# Patient Record
Sex: FEMALE | Race: WHITE | NOT HISPANIC OR LATINO | Employment: OTHER | URBAN - NONMETROPOLITAN AREA
[De-identification: names, ages, dates, MRNs, and addresses within clinical notes are randomized per-mention and may not be internally consistent; named-entity substitution may affect disease eponyms.]

---

## 2023-03-07 ENCOUNTER — TELEPHONE (OUTPATIENT)
Dept: PRIMARY CARE | Facility: CLINIC | Age: 73
End: 2023-03-07
Payer: COMMERCIAL

## 2023-03-07 NOTE — TELEPHONE ENCOUNTER
----- Message from Lynette Meeks DO sent at 3/7/2023 11:26 AM EST -----  I went ahead and completed the note which is now ready for pickup.  ----- Message -----  From: Marilyn Escudero MA  Sent: 3/7/2023   8:23 AM EST  To: Lynette Meeks DO    Pt states that she is anorexic and it causes her to have a lot of anxiety.   ----- Message -----  From: Lynette Meeks DO  Sent: 3/7/2023   7:20 AM EST  To: Marilyn Escudero MA    I tried to call her this morning about her request to receive a doctor's excuse to avoid jury duty. I had to leave a voice mail. I told her that we always need to list a specific reason for the excuse and I need to know what her reason is. I started a script which is attached to the paperwork that she sent in. Thanks

## 2023-06-26 DIAGNOSIS — M81.0 OSTEOPOROSIS, UNSPECIFIED OSTEOPOROSIS TYPE, UNSPECIFIED PATHOLOGICAL FRACTURE PRESENCE: ICD-10-CM

## 2023-06-26 RX ORDER — IBANDRONATE SODIUM 150 MG/1
150 TABLET, FILM COATED ORAL
Qty: 1 TABLET | Refills: 11 | Status: SHIPPED | OUTPATIENT
Start: 2023-06-26 | End: 2023-07-27 | Stop reason: SDUPTHER

## 2023-06-26 RX ORDER — IBANDRONATE SODIUM 150 MG/1
150 TABLET, FILM COATED ORAL
COMMUNITY
End: 2023-06-26 | Stop reason: SDUPTHER

## 2023-07-27 ENCOUNTER — LAB (OUTPATIENT)
Dept: LAB | Facility: LAB | Age: 73
End: 2023-07-27
Payer: COMMERCIAL

## 2023-07-27 ENCOUNTER — OFFICE VISIT (OUTPATIENT)
Dept: PRIMARY CARE | Facility: CLINIC | Age: 73
End: 2023-07-27
Payer: COMMERCIAL

## 2023-07-27 VITALS
SYSTOLIC BLOOD PRESSURE: 126 MMHG | BODY MASS INDEX: 20.57 KG/M2 | HEIGHT: 62 IN | DIASTOLIC BLOOD PRESSURE: 72 MMHG | WEIGHT: 111.8 LBS | HEART RATE: 62 BPM | OXYGEN SATURATION: 98 %

## 2023-07-27 DIAGNOSIS — B37.2 YEAST DERMATITIS: ICD-10-CM

## 2023-07-27 DIAGNOSIS — E78.2 MIXED HYPERLIPIDEMIA: ICD-10-CM

## 2023-07-27 DIAGNOSIS — R53.83 OTHER FATIGUE: ICD-10-CM

## 2023-07-27 DIAGNOSIS — K21.9 CHRONIC GERD: ICD-10-CM

## 2023-07-27 DIAGNOSIS — R09.82 PND (POST-NASAL DRIP): Primary | ICD-10-CM

## 2023-07-27 DIAGNOSIS — M81.0 OSTEOPOROSIS, UNSPECIFIED OSTEOPOROSIS TYPE, UNSPECIFIED PATHOLOGICAL FRACTURE PRESENCE: ICD-10-CM

## 2023-07-27 PROBLEM — I65.23 STENOSIS OF BOTH EXTERNAL CAROTID ARTERIES: Status: ACTIVE | Noted: 2023-07-27

## 2023-07-27 PROBLEM — G47.00 INSOMNIA: Status: ACTIVE | Noted: 2023-07-27

## 2023-07-27 PROBLEM — K59.09 CHRONIC CONSTIPATION: Status: ACTIVE | Noted: 2023-07-27

## 2023-07-27 PROBLEM — F41.9 ANXIETY: Status: ACTIVE | Noted: 2023-07-27

## 2023-07-27 PROBLEM — R69 TAKING MEDICATION FOR CHRONIC DISEASE: Status: ACTIVE | Noted: 2023-07-27

## 2023-07-27 LAB
BASOPHILS (10*3/UL) IN BLOOD BY AUTOMATED COUNT: 0.03 X10E9/L (ref 0–0.1)
BASOPHILS/100 LEUKOCYTES IN BLOOD BY AUTOMATED COUNT: 0.7 % (ref 0–2)
EOSINOPHILS (10*3/UL) IN BLOOD BY AUTOMATED COUNT: 0.07 X10E9/L (ref 0–0.4)
EOSINOPHILS/100 LEUKOCYTES IN BLOOD BY AUTOMATED COUNT: 1.6 % (ref 0–6)
ERYTHROCYTE DISTRIBUTION WIDTH (RATIO) BY AUTOMATED COUNT: 13.2 % (ref 11.5–14.5)
ERYTHROCYTE MEAN CORPUSCULAR HEMOGLOBIN CONCENTRATION (G/DL) BY AUTOMATED: 32.6 G/DL (ref 32–36)
ERYTHROCYTE MEAN CORPUSCULAR VOLUME (FL) BY AUTOMATED COUNT: 97 FL (ref 80–100)
ERYTHROCYTES (10*6/UL) IN BLOOD BY AUTOMATED COUNT: 4.25 X10E12/L (ref 4–5.2)
HEMATOCRIT (%) IN BLOOD BY AUTOMATED COUNT: 41.1 % (ref 36–46)
HEMOGLOBIN (G/DL) IN BLOOD: 13.4 G/DL (ref 12–16)
IMMATURE GRANULOCYTES/100 LEUKOCYTES IN BLOOD BY AUTOMATED COUNT: 0.2 % (ref 0–0.9)
LEUKOCYTES (10*3/UL) IN BLOOD BY AUTOMATED COUNT: 4.3 X10E9/L (ref 4.4–11.3)
LYMPHOCYTES (10*3/UL) IN BLOOD BY AUTOMATED COUNT: 1.26 X10E9/L (ref 0.8–3)
LYMPHOCYTES/100 LEUKOCYTES IN BLOOD BY AUTOMATED COUNT: 29.5 % (ref 13–44)
MONOCYTES (10*3/UL) IN BLOOD BY AUTOMATED COUNT: 0.49 X10E9/L (ref 0.05–0.8)
MONOCYTES/100 LEUKOCYTES IN BLOOD BY AUTOMATED COUNT: 11.5 % (ref 2–10)
NEUTROPHILS (10*3/UL) IN BLOOD BY AUTOMATED COUNT: 2.41 X10E9/L (ref 1.6–5.5)
NEUTROPHILS/100 LEUKOCYTES IN BLOOD BY AUTOMATED COUNT: 56.5 % (ref 40–80)
PLATELETS (10*3/UL) IN BLOOD AUTOMATED COUNT: 253 X10E9/L (ref 150–450)
THYROTROPIN (MIU/L) IN SER/PLAS BY DETECTION LIMIT <= 0.05 MIU/L: 3.15 MIU/L (ref 0.44–3.98)

## 2023-07-27 PROCEDURE — 1159F MED LIST DOCD IN RCRD: CPT | Performed by: INTERNAL MEDICINE

## 2023-07-27 PROCEDURE — 84443 ASSAY THYROID STIM HORMONE: CPT

## 2023-07-27 PROCEDURE — 36415 COLL VENOUS BLD VENIPUNCTURE: CPT

## 2023-07-27 PROCEDURE — 99214 OFFICE O/P EST MOD 30 MIN: CPT | Performed by: INTERNAL MEDICINE

## 2023-07-27 PROCEDURE — 1036F TOBACCO NON-USER: CPT | Performed by: INTERNAL MEDICINE

## 2023-07-27 PROCEDURE — 85025 COMPLETE CBC W/AUTO DIFF WBC: CPT

## 2023-07-27 RX ORDER — LORATADINE 10 MG/1
10 TABLET ORAL DAILY
Qty: 30 TABLET | Refills: 5 | Status: CANCELLED | OUTPATIENT
Start: 2023-07-27

## 2023-07-27 RX ORDER — NYSTATIN 100000 U/G
OINTMENT TOPICAL DAILY PRN
Qty: 15 G | Refills: 5 | Status: SHIPPED | OUTPATIENT
Start: 2023-07-27 | End: 2024-01-22

## 2023-07-27 RX ORDER — IBANDRONATE SODIUM 150 MG/1
150 TABLET, FILM COATED ORAL
Qty: 1 TABLET | Refills: 11 | Status: SHIPPED | OUTPATIENT
Start: 2023-07-27 | End: 2024-01-30 | Stop reason: SDUPTHER

## 2023-07-27 RX ORDER — LORATADINE 10 MG/1
10 TABLET ORAL DAILY
COMMUNITY
End: 2023-08-07 | Stop reason: SDUPTHER

## 2023-07-27 RX ORDER — PRAVASTATIN SODIUM 20 MG/1
20 TABLET ORAL DAILY
COMMUNITY
End: 2023-07-27 | Stop reason: SDUPTHER

## 2023-07-27 RX ORDER — FLUTICASONE PROPIONATE 50 MCG
SPRAY, SUSPENSION (ML) NASAL
COMMUNITY
End: 2023-07-27 | Stop reason: SDUPTHER

## 2023-07-27 RX ORDER — FAMOTIDINE 20 MG/1
20 TABLET, FILM COATED ORAL 2 TIMES DAILY
Qty: 60 TABLET | Refills: 5 | Status: SHIPPED | OUTPATIENT
Start: 2023-07-27 | End: 2024-01-22

## 2023-07-27 RX ORDER — FLUTICASONE PROPIONATE 50 MCG
SPRAY, SUSPENSION (ML) NASAL
Qty: 16 G | Refills: 5 | Status: SHIPPED | OUTPATIENT
Start: 2023-07-27 | End: 2024-01-22

## 2023-07-27 RX ORDER — FAMOTIDINE 20 MG/1
20 TABLET, FILM COATED ORAL 2 TIMES DAILY
COMMUNITY
End: 2023-07-27 | Stop reason: SDUPTHER

## 2023-07-27 RX ORDER — MINERAL OIL
180 ENEMA (ML) RECTAL DAILY
Qty: 30 TABLET | Refills: 11 | Status: SHIPPED | OUTPATIENT
Start: 2023-07-27 | End: 2024-01-30 | Stop reason: WASHOUT

## 2023-07-27 RX ORDER — PRAVASTATIN SODIUM 20 MG/1
20 TABLET ORAL DAILY
Qty: 30 TABLET | Refills: 5 | Status: SHIPPED | OUTPATIENT
Start: 2023-07-27 | End: 2024-01-22

## 2023-07-27 RX ORDER — NYSTATIN 100000 U/G
OINTMENT TOPICAL
COMMUNITY
Start: 2022-12-26 | End: 2023-07-27 | Stop reason: SDUPTHER

## 2023-07-27 ASSESSMENT — ENCOUNTER SYMPTOMS
NAUSEA: 0
CHEST TIGHTNESS: 0
ABDOMINAL PAIN: 0
WHEEZING: 0
SHORTNESS OF BREATH: 0
ARTHRALGIAS: 0
PALPITATIONS: 0
DIARRHEA: 0
RHINORRHEA: 1
BACK PAIN: 0
VOMITING: 0
FATIGUE: 1
BLOOD IN STOOL: 0
COUGH: 0

## 2023-07-27 ASSESSMENT — PATIENT HEALTH QUESTIONNAIRE - PHQ9
2. FEELING DOWN, DEPRESSED OR HOPELESS: NOT AT ALL
1. LITTLE INTEREST OR PLEASURE IN DOING THINGS: NOT AT ALL
SUM OF ALL RESPONSES TO PHQ9 QUESTIONS 1 AND 2: 0

## 2023-07-27 NOTE — ASSESSMENT & PLAN NOTE
-Because of her complaints of fatigue which I feel is probably allergy induced, I will have her get a CBC and TSH drawn today before leaving and have agreed to call her with results

## 2023-07-27 NOTE — ASSESSMENT & PLAN NOTE
-We will check a fasting lipid profile just prior to her next follow-up visit  -She will continue with Pravachol 20 mg daily

## 2023-07-27 NOTE — ASSESSMENT & PLAN NOTE
-She is scheduled for a bone density next month and I will try to contact her regarding the results  -She will remain on Boniva 150 mg once a month  -Also encourage calcium and vitamin D supplementation and weightbearing exercises

## 2023-07-27 NOTE — PATIENT INSTRUCTIONS
As we discussed I am recommending that you use the Flonase by taking 1 spray to each nostril twice a day and I sent a new prescription for medication called Allegra or fexofenadine.  This will replace your Claritin and please let me know if this does not help with your allergies  Allergy symptoms can cause 1 to feel tired   I will have you get a CBC and thyroid blood test drawn today before leaving and I will call you with results  Otherwise if everything goes according to plan I will see you back in 6 months for reevaluation and we will be checking your cholesterol as well as your blood sugar and kidney at that time so please remember to go fasting prior to that visit

## 2023-07-27 NOTE — PROGRESS NOTES
Subjective   Patient ID: Jaida Malcolm is a 72 y.o. female who presents for No chief complaint on file..  HPI  She is here today for her 6-month checkup.  Unfortunately she had to have her bone density and mammogram rescheduled for early August but I told her that she could check the results in [facility] and I will also try to message her about the results.  Otherwise she is doing about the same as when we saw her last time.  1 good thing that has changed is that she no longer suffers from constipation.  She states she had constipation for years and had to rely on daily Colace.  She stopped it and she is still having a normal-appearing bowel movement on a regular basis.  She still has allergies with postnasal drip.  She does use the Flonase sometimes with 1 spray to each nostril.  We talked about using it more consistently with 1 spray twice a day to each nostril.  She also feels like the Claritin has not been very effective so we will try to switch to Allegra and I will be sending a prescription to her pharmacy per her request.  She also states she is very physically active.  She still mows 1 acre with a hand a mower and she is strong when doing those activities.  She states sometimes when she sits down to do a puzzle she starts feeling an overwhelming fatigue and wants to fall asleep.  She has had problems with insomnia in the past.  Overall we agreed that her energy levels appear to be okay.  And possibly the allergies are causing some problems with sleeping well at night.  We did conduct a review of systems we briefly discussed how she slightly gets dizzy when standing up from a seated position.  We felt in the past that she had orthostasis and it does quickly clear after she stands.  She knows to call if her condition is worsening and otherwise we would see her back in 6 months for reevaluation with lab work  Review of Systems   Constitutional:  Positive for fatigue.   HENT:  Positive for congestion and  rhinorrhea.    Respiratory:  Negative for cough, chest tightness, shortness of breath and wheezing.    Cardiovascular:  Negative for chest pain, palpitations and leg swelling.   Gastrointestinal:  Negative for abdominal pain, blood in stool, diarrhea, nausea and vomiting.   Musculoskeletal:  Negative for arthralgias and back pain.     Objective   Physical Exam  Vitals and nursing note reviewed.   Constitutional:       General: She is not in acute distress.     Appearance: Normal appearance.   HENT:      Head: Normocephalic and atraumatic.   Eyes:      Conjunctiva/sclera: Conjunctivae normal.   Cardiovascular:      Rate and Rhythm: Normal rate and regular rhythm.      Heart sounds: Normal heart sounds.   Pulmonary:      Effort: No respiratory distress.      Breath sounds: No wheezing.   Abdominal:      Palpations: Abdomen is soft.      Tenderness: There is no abdominal tenderness. There is no guarding.   Musculoskeletal:         General: No swelling. Normal range of motion.   Skin:     General: Skin is warm and dry.   Neurological:      General: No focal deficit present.      Mental Status: She is alert and oriented to person, place, and time.   Psychiatric:         Behavior: Behavior normal.       Assessment/Plan   Problem List Items Addressed This Visit       PND (post-nasal drip) - Primary     -We are going to switch from Claritin to Allegra to see if this is more beneficial  -She will use the Flonase by doing 2 sprays to each nostril every day         Relevant Medications    fluticasone (Flonase) 50 mcg/actuation nasal spray    fexofenadine (Allegra) 180 mg tablet    Mixed hyperlipidemia     -We will check a fasting lipid profile just prior to her next follow-up visit  -She will continue with Pravachol 20 mg daily         Relevant Medications    pravastatin (Pravachol) 20 mg tablet    Other Relevant Orders    Lipid Panel    Osteoporosis     -She is scheduled for a bone density next month and I will try to contact  her regarding the results  -She will remain on Boniva 150 mg once a month  -Also encourage calcium and vitamin D supplementation and weightbearing exercises         Relevant Medications    ibandronate (Boniva) 150 mg tablet    Chronic GERD     -Appears to be relatively well controlled with famotidine 20 mg twice daily         Relevant Medications    famotidine (Pepcid) 20 mg tablet    Other Relevant Orders    Basic Metabolic Panel    Yeast dermatitis     -We did refill the Mycolog for an as-needed basis         Relevant Medications    nystatin (Mycostatin) ointment    Other fatigue     -Because of her complaints of fatigue which I feel is probably allergy induced, I will have her get a CBC and TSH drawn today before leaving and have agreed to call her with results         Relevant Orders    CBC and Auto Differential    TSH          Lynette Meeks, DO

## 2023-07-27 NOTE — ASSESSMENT & PLAN NOTE
-We are going to switch from Claritin to Allegra to see if this is more beneficial  -She will use the Flonase by doing 2 sprays to each nostril every day

## 2023-07-31 DIAGNOSIS — R09.82 PND (POST-NASAL DRIP): Primary | ICD-10-CM

## 2023-08-07 RX ORDER — LORATADINE 10 MG/1
10 TABLET ORAL DAILY
Qty: 30 TABLET | Refills: 1 | Status: SHIPPED | OUTPATIENT
Start: 2023-08-07 | End: 2023-08-10 | Stop reason: SDUPTHER

## 2023-08-10 DIAGNOSIS — R09.82 PND (POST-NASAL DRIP): ICD-10-CM

## 2023-08-10 RX ORDER — LORATADINE 10 MG/1
10 TABLET ORAL DAILY
Qty: 30 TABLET | Refills: 11 | Status: SHIPPED | OUTPATIENT
Start: 2023-08-10 | End: 2024-01-30 | Stop reason: SDUPTHER

## 2024-01-22 DIAGNOSIS — R09.82 PND (POST-NASAL DRIP): ICD-10-CM

## 2024-01-22 DIAGNOSIS — K21.9 CHRONIC GERD: ICD-10-CM

## 2024-01-22 DIAGNOSIS — B37.2 YEAST DERMATITIS: ICD-10-CM

## 2024-01-22 DIAGNOSIS — E78.2 MIXED HYPERLIPIDEMIA: ICD-10-CM

## 2024-01-22 RX ORDER — FLUTICASONE PROPIONATE 50 MCG
SPRAY, SUSPENSION (ML) NASAL
Qty: 16 G | Refills: 5 | Status: SHIPPED | OUTPATIENT
Start: 2024-01-22 | End: 2024-01-30 | Stop reason: SDUPTHER

## 2024-01-22 RX ORDER — PRAVASTATIN SODIUM 20 MG/1
20 TABLET ORAL DAILY
Qty: 30 TABLET | Refills: 5 | Status: SHIPPED | OUTPATIENT
Start: 2024-01-22 | End: 2024-01-30 | Stop reason: SDUPTHER

## 2024-01-22 RX ORDER — FAMOTIDINE 20 MG/1
20 TABLET, FILM COATED ORAL 2 TIMES DAILY
Qty: 60 TABLET | Refills: 5 | Status: SHIPPED | OUTPATIENT
Start: 2024-01-22 | End: 2024-01-30 | Stop reason: SDUPTHER

## 2024-01-22 RX ORDER — NYSTATIN 100000 U/G
OINTMENT TOPICAL
Qty: 15 G | Refills: 5 | Status: SHIPPED | OUTPATIENT
Start: 2024-01-22

## 2024-01-25 ENCOUNTER — LAB (OUTPATIENT)
Dept: LAB | Facility: LAB | Age: 74
End: 2024-01-25
Payer: COMMERCIAL

## 2024-01-25 DIAGNOSIS — E78.2 MIXED HYPERLIPIDEMIA: ICD-10-CM

## 2024-01-25 DIAGNOSIS — K21.9 CHRONIC GERD: ICD-10-CM

## 2024-01-25 LAB
ANION GAP SERPL CALC-SCNC: 11 MMOL/L (ref 10–20)
BUN SERPL-MCNC: 19 MG/DL (ref 6–23)
CALCIUM SERPL-MCNC: 9.3 MG/DL (ref 8.6–10.3)
CHLORIDE SERPL-SCNC: 105 MMOL/L (ref 98–107)
CHOLEST SERPL-MCNC: 210 MG/DL (ref 0–199)
CHOLESTEROL/HDL RATIO: 2.8
CO2 SERPL-SCNC: 29 MMOL/L (ref 21–32)
CREAT SERPL-MCNC: 0.81 MG/DL (ref 0.5–1.05)
EGFRCR SERPLBLD CKD-EPI 2021: 77 ML/MIN/1.73M*2
GLUCOSE SERPL-MCNC: 92 MG/DL (ref 74–99)
HDLC SERPL-MCNC: 75 MG/DL
LDLC SERPL CALC-MCNC: 107 MG/DL
NON HDL CHOLESTEROL: 135 MG/DL (ref 0–149)
POTASSIUM SERPL-SCNC: 4.4 MMOL/L (ref 3.5–5.3)
SODIUM SERPL-SCNC: 141 MMOL/L (ref 136–145)
TRIGL SERPL-MCNC: 142 MG/DL (ref 0–149)
VLDL: 28 MG/DL (ref 0–40)

## 2024-01-25 PROCEDURE — 36415 COLL VENOUS BLD VENIPUNCTURE: CPT

## 2024-01-25 PROCEDURE — 80048 BASIC METABOLIC PNL TOTAL CA: CPT

## 2024-01-25 PROCEDURE — 80061 LIPID PANEL: CPT

## 2024-01-30 ENCOUNTER — OFFICE VISIT (OUTPATIENT)
Dept: PRIMARY CARE | Facility: CLINIC | Age: 74
End: 2024-01-30
Payer: COMMERCIAL

## 2024-01-30 VITALS
OXYGEN SATURATION: 98 % | HEART RATE: 95 BPM | WEIGHT: 114 LBS | BODY MASS INDEX: 20.98 KG/M2 | HEIGHT: 62 IN | SYSTOLIC BLOOD PRESSURE: 122 MMHG | DIASTOLIC BLOOD PRESSURE: 66 MMHG

## 2024-01-30 DIAGNOSIS — R69 TAKING MEDICATION FOR CHRONIC DISEASE: ICD-10-CM

## 2024-01-30 DIAGNOSIS — K64.9 ACUTE HEMORRHOID: ICD-10-CM

## 2024-01-30 DIAGNOSIS — M81.0 OSTEOPOROSIS, UNSPECIFIED OSTEOPOROSIS TYPE, UNSPECIFIED PATHOLOGICAL FRACTURE PRESENCE: ICD-10-CM

## 2024-01-30 DIAGNOSIS — H93.13 TINNITUS OF BOTH EARS: Primary | ICD-10-CM

## 2024-01-30 DIAGNOSIS — K21.9 CHRONIC GERD: ICD-10-CM

## 2024-01-30 DIAGNOSIS — I65.23 STENOSIS OF BOTH EXTERNAL CAROTID ARTERIES: ICD-10-CM

## 2024-01-30 DIAGNOSIS — E78.2 MIXED HYPERLIPIDEMIA: ICD-10-CM

## 2024-01-30 DIAGNOSIS — R09.82 PND (POST-NASAL DRIP): ICD-10-CM

## 2024-01-30 PROBLEM — K57.90 DIVERTICULAR DISEASE: Status: RESOLVED | Noted: 2024-01-30 | Resolved: 2024-01-30

## 2024-01-30 PROBLEM — B37.2 YEAST DERMATITIS: Status: RESOLVED | Noted: 2023-07-27 | Resolved: 2024-01-30

## 2024-01-30 PROBLEM — R53.83 OTHER FATIGUE: Status: RESOLVED | Noted: 2023-07-27 | Resolved: 2024-01-30

## 2024-01-30 PROBLEM — R51.9 TEMPORAL HEADACHE: Status: RESOLVED | Noted: 2024-01-30 | Resolved: 2024-01-30

## 2024-01-30 PROCEDURE — 99214 OFFICE O/P EST MOD 30 MIN: CPT | Performed by: INTERNAL MEDICINE

## 2024-01-30 PROCEDURE — 1036F TOBACCO NON-USER: CPT | Performed by: INTERNAL MEDICINE

## 2024-01-30 PROCEDURE — 1159F MED LIST DOCD IN RCRD: CPT | Performed by: INTERNAL MEDICINE

## 2024-01-30 PROCEDURE — 1160F RVW MEDS BY RX/DR IN RCRD: CPT | Performed by: INTERNAL MEDICINE

## 2024-01-30 RX ORDER — PRAVASTATIN SODIUM 20 MG/1
20 TABLET ORAL DAILY
Qty: 30 TABLET | Refills: 5 | Status: SHIPPED | OUTPATIENT
Start: 2024-01-30

## 2024-01-30 RX ORDER — HYDROCORTISONE 25 MG/G
CREAM TOPICAL 4 TIMES DAILY PRN
Qty: 30 G | Refills: 0 | Status: SHIPPED | OUTPATIENT
Start: 2024-01-30 | End: 2024-02-06

## 2024-01-30 RX ORDER — IBANDRONATE SODIUM 150 MG/1
150 TABLET, FILM COATED ORAL
Qty: 1 TABLET | Refills: 5 | Status: SHIPPED | OUTPATIENT
Start: 2024-01-30

## 2024-01-30 RX ORDER — LORATADINE 10 MG/1
10 TABLET ORAL DAILY
Qty: 30 TABLET | Refills: 5 | Status: SHIPPED | OUTPATIENT
Start: 2024-01-30

## 2024-01-30 RX ORDER — FLUTICASONE PROPIONATE 50 MCG
SPRAY, SUSPENSION (ML) NASAL
Qty: 16 G | Refills: 5 | Status: SHIPPED | OUTPATIENT
Start: 2024-01-30

## 2024-01-30 RX ORDER — FAMOTIDINE 20 MG/1
20 TABLET, FILM COATED ORAL 2 TIMES DAILY
Qty: 60 TABLET | Refills: 5 | Status: SHIPPED | OUTPATIENT
Start: 2024-01-30

## 2024-01-30 ASSESSMENT — ENCOUNTER SYMPTOMS
BACK PAIN: 0
DIARRHEA: 0
WHEEZING: 0
VOMITING: 0
BLOOD IN STOOL: 0
PALPITATIONS: 0
COUGH: 0
FATIGUE: 0
NAUSEA: 0
ARTHRALGIAS: 0
ABDOMINAL PAIN: 0
SHORTNESS OF BREATH: 0

## 2024-01-30 ASSESSMENT — PATIENT HEALTH QUESTIONNAIRE - PHQ9
SUM OF ALL RESPONSES TO PHQ9 QUESTIONS 1 AND 2: 0
2. FEELING DOWN, DEPRESSED OR HOPELESS: NOT AT ALL
1. LITTLE INTEREST OR PLEASURE IN DOING THINGS: NOT AT ALL

## 2024-01-30 NOTE — ASSESSMENT & PLAN NOTE
-Her cholesterol profile is excellent and we will continue to check it once a year per Medicare protocol

## 2024-01-30 NOTE — ASSESSMENT & PLAN NOTE
-I am providing a prescription of Anusol and she will call if her hemorrhoids are not improving or she has bleeding or pain

## 2024-01-30 NOTE — PROGRESS NOTES
Subjective   Patient ID: Jaida Malcolm is a 73 y.o. female who presents for Follow-up (6 MO FUV. ).  HPI  She is today for her general 6-month checkup.  Overall she appears to be doing well although she states the winter months are sometimes tough because she does not have a lot to do and she hates the cold weather.  We did conduct a review of systems and I am pleased with her blood pressure today as well as her weight.  We also reviewed her most recent laboratory test results and overall I am very pleased with her numbers.  Her cholesterol profile is excellent and she will continue taking her current cholesterol-lowering medication.  We also discussed her problem list and her medications today.  We are giving her refills on all of her prescription medications.  We talked about her history of mild plaque buildup in the carotid arteries and her last assessment was in March 2020.  I will have her get a carotid Doppler just prior to her next follow-up visit in 6 months.  Review of Systems   Constitutional:  Negative for fatigue.   Respiratory:  Negative for cough, shortness of breath and wheezing.    Cardiovascular:  Negative for chest pain, palpitations and leg swelling.   Gastrointestinal:  Negative for abdominal pain, blood in stool, diarrhea, nausea and vomiting.   Musculoskeletal:  Negative for arthralgias and back pain.     Objective   Physical Exam  Vitals and nursing note reviewed.   Constitutional:       General: She is not in acute distress.     Appearance: Normal appearance.   HENT:      Head: Normocephalic and atraumatic.   Eyes:      Conjunctiva/sclera: Conjunctivae normal.   Cardiovascular:      Rate and Rhythm: Normal rate and regular rhythm.      Heart sounds: Normal heart sounds.   Pulmonary:      Effort: No respiratory distress.      Breath sounds: No wheezing.   Abdominal:      Palpations: Abdomen is soft.      Tenderness: There is no abdominal tenderness. There is no guarding.   Musculoskeletal:          General: No swelling. Normal range of motion.   Skin:     General: Skin is warm and dry.   Neurological:      General: No focal deficit present.      Mental Status: She is alert and oriented to person, place, and time.   Psychiatric:         Behavior: Behavior normal.       Recent Results (from the past 672 hour(s))   Basic Metabolic Panel    Collection Time: 01/25/24  7:12 AM   Result Value Ref Range    Glucose 92 74 - 99 mg/dL    Sodium 141 136 - 145 mmol/L    Potassium 4.4 3.5 - 5.3 mmol/L    Chloride 105 98 - 107 mmol/L    Bicarbonate 29 21 - 32 mmol/L    Anion Gap 11 10 - 20 mmol/L    Urea Nitrogen 19 6 - 23 mg/dL    Creatinine 0.81 0.50 - 1.05 mg/dL    eGFR 77 >60 mL/min/1.73m*2    Calcium 9.3 8.6 - 10.3 mg/dL   Lipid Panel    Collection Time: 01/25/24  7:12 AM   Result Value Ref Range    Cholesterol 210 (H) 0 - 199 mg/dL    HDL-Cholesterol 75.0 mg/dL    Cholesterol/HDL Ratio 2.8     LDL Calculated 107 (H) <=99 mg/dL    VLDL 28 0 - 40 mg/dL    Triglycerides 142 0 - 149 mg/dL    Non HDL Cholesterol 135 0 - 149 mg/dL       Assessment/Plan   Problem List Items Addressed This Visit             ICD-10-CM    PND (post-nasal drip) R09.82     -She is getting along okay with her Flonase nasal spray         Relevant Medications    loratadine (Claritin) 10 mg tablet    fluticasone (Flonase) 50 mcg/actuation nasal spray    Mixed hyperlipidemia E78.2     -Her cholesterol profile is excellent and we will continue to check it once a year per Medicare protocol         Relevant Medications    pravastatin (Pravachol) 20 mg tablet    Osteoporosis M81.0     -Continue with Boniva and focus on fall prevention  -Continue with calcium and vitamin D supplementation         Relevant Medications    ibandronate (Boniva) 150 mg tablet    Stenosis of both external carotid arteries I65.23     -Her last evaluation was in March 2020 and she had mild plaque buildup bilaterally.  -We will check another carotid study just prior to her next  follow-up visit and we will continue to aggressively modify risk factor         Relevant Orders    Vascular US carotid artery duplex bilateral    Chronic GERD K21.9     -Stable at this time and we will continue with her medication         Relevant Medications    famotidine (Pepcid) 20 mg tablet    Taking medication for chronic disease R69    Relevant Orders    Basic Metabolic Panel    Tinnitus of both ears - Primary H93.13     -Complains of some mild ringing in your ears.  We talked about how high frequency hearing loss can contribute and if she desires we could refer her for hearing test or to ENT.  She will let me know         Acute hemorrhoid K64.9     -I am providing a prescription of Anusol and she will call if her hemorrhoids are not improving or she has bleeding or pain         Relevant Medications    hydrocortisone (Anusol-HC) 2.5 % rectal cream          Lynette Meeks, DO

## 2024-01-30 NOTE — ASSESSMENT & PLAN NOTE
-Continue with Boniva and focus on fall prevention  -Continue with calcium and vitamin D supplementation

## 2024-01-30 NOTE — ASSESSMENT & PLAN NOTE
-Complains of some mild ringing in your ears.  We talked about how high frequency hearing loss can contribute and if she desires we could refer her for hearing test or to ENT.  She will let me know

## 2024-01-30 NOTE — PATIENT INSTRUCTIONS
As we discussed I sent a prescription to your pharmacy for medication called Anusol which is a steroid cream to apply to hemorrhoids.  Please be sure to not use this to long because it can cause thinning of the skin.  Please also remember the other things we discussed about getting yourself clean and so forth.  If your hemorrhoids continue to be a problem or source of irritation and I would like for you to be evaluated with a colonoscopy  We have provided refills on all your medications and we will see you back in 6 months for another evaluation.  Just prior to that visit you should be scheduled to have carotid studies to check the arteries in your neck and you will also need to remember to get fasting lab work prior to that visit

## 2024-01-30 NOTE — ASSESSMENT & PLAN NOTE
-Her last evaluation was in March 2020 and she had mild plaque buildup bilaterally.  -We will check another carotid study just prior to her next follow-up visit and we will continue to aggressively modify risk factor

## 2024-05-21 ENCOUNTER — LAB (OUTPATIENT)
Dept: LAB | Facility: LAB | Age: 74
End: 2024-05-21
Payer: COMMERCIAL

## 2024-05-21 ENCOUNTER — OFFICE VISIT (OUTPATIENT)
Dept: PRIMARY CARE | Facility: CLINIC | Age: 74
End: 2024-05-21
Payer: COMMERCIAL

## 2024-05-21 VITALS
HEART RATE: 78 BPM | HEIGHT: 62 IN | DIASTOLIC BLOOD PRESSURE: 72 MMHG | WEIGHT: 113 LBS | SYSTOLIC BLOOD PRESSURE: 138 MMHG | OXYGEN SATURATION: 96 % | BODY MASS INDEX: 20.8 KG/M2

## 2024-05-21 DIAGNOSIS — R53.83 OTHER FATIGUE: ICD-10-CM

## 2024-05-21 DIAGNOSIS — G47.9 SLEEP DIFFICULTIES: ICD-10-CM

## 2024-05-21 DIAGNOSIS — R09.82 PND (POST-NASAL DRIP): ICD-10-CM

## 2024-05-21 DIAGNOSIS — H93.13 TINNITUS OF BOTH EARS: Primary | ICD-10-CM

## 2024-05-21 LAB
BASOPHILS # BLD AUTO: 0.03 X10*3/UL (ref 0–0.1)
BASOPHILS NFR BLD AUTO: 0.7 %
EOSINOPHIL # BLD AUTO: 0.07 X10*3/UL (ref 0–0.4)
EOSINOPHIL NFR BLD AUTO: 1.7 %
ERYTHROCYTE [DISTWIDTH] IN BLOOD BY AUTOMATED COUNT: 13 % (ref 11.5–14.5)
HCT VFR BLD AUTO: 39.1 % (ref 36–46)
HGB BLD-MCNC: 12.9 G/DL (ref 12–16)
IMM GRANULOCYTES # BLD AUTO: 0 X10*3/UL (ref 0–0.5)
IMM GRANULOCYTES NFR BLD AUTO: 0 % (ref 0–0.9)
LYMPHOCYTES # BLD AUTO: 1.25 X10*3/UL (ref 0.8–3)
LYMPHOCYTES NFR BLD AUTO: 31 %
MCH RBC QN AUTO: 31.3 PG (ref 26–34)
MCHC RBC AUTO-ENTMCNC: 33 G/DL (ref 32–36)
MCV RBC AUTO: 95 FL (ref 80–100)
MONOCYTES # BLD AUTO: 0.49 X10*3/UL (ref 0.05–0.8)
MONOCYTES NFR BLD AUTO: 12.2 %
NEUTROPHILS # BLD AUTO: 2.19 X10*3/UL (ref 1.6–5.5)
NEUTROPHILS NFR BLD AUTO: 54.4 %
NRBC BLD-RTO: 0 /100 WBCS (ref 0–0)
PLATELET # BLD AUTO: 233 X10*3/UL (ref 150–450)
RBC # BLD AUTO: 4.12 X10*6/UL (ref 4–5.2)
TSH SERPL-ACNC: 3.34 MIU/L (ref 0.44–3.98)
WBC # BLD AUTO: 4 X10*3/UL (ref 4.4–11.3)

## 2024-05-21 PROCEDURE — 1036F TOBACCO NON-USER: CPT | Performed by: INTERNAL MEDICINE

## 2024-05-21 PROCEDURE — 1159F MED LIST DOCD IN RCRD: CPT | Performed by: INTERNAL MEDICINE

## 2024-05-21 PROCEDURE — 1160F RVW MEDS BY RX/DR IN RCRD: CPT | Performed by: INTERNAL MEDICINE

## 2024-05-21 PROCEDURE — 85025 COMPLETE CBC W/AUTO DIFF WBC: CPT

## 2024-05-21 PROCEDURE — 36415 COLL VENOUS BLD VENIPUNCTURE: CPT

## 2024-05-21 PROCEDURE — 99213 OFFICE O/P EST LOW 20 MIN: CPT | Performed by: INTERNAL MEDICINE

## 2024-05-21 PROCEDURE — 84443 ASSAY THYROID STIM HORMONE: CPT

## 2024-05-21 RX ORDER — MONTELUKAST SODIUM 10 MG/1
10 TABLET ORAL NIGHTLY
Qty: 30 TABLET | Refills: 5 | Status: SHIPPED | OUTPATIENT
Start: 2024-05-21 | End: 2024-06-04 | Stop reason: WASHOUT

## 2024-05-21 RX ORDER — NORTRIPTYLINE HYDROCHLORIDE 10 MG/1
10 CAPSULE ORAL NIGHTLY
Qty: 30 CAPSULE | Refills: 5 | Status: SHIPPED | OUTPATIENT
Start: 2024-05-21 | End: 2024-06-04 | Stop reason: WASHOUT

## 2024-05-21 ASSESSMENT — ENCOUNTER SYMPTOMS
ARTHRALGIAS: 0
FATIGUE: 1
NAUSEA: 0
ABDOMINAL PAIN: 0
UNEXPECTED WEIGHT CHANGE: 0
BLOOD IN STOOL: 0
SHORTNESS OF BREATH: 0
WHEEZING: 0
DIARRHEA: 0
PALPITATIONS: 0
BACK PAIN: 0
CHEST TIGHTNESS: 0
COUGH: 0
VOMITING: 0

## 2024-05-21 NOTE — ASSESSMENT & PLAN NOTE
-She will have a TSH and CBC done today before leaving  -I believe that her fragmented sleep might be a culprit so we will try to treat that and see her back in follow-up  -Also sinus disease can cause some fatigue due to abnormal breathing patterns while sleeping at night

## 2024-05-21 NOTE — PROGRESS NOTES
Subjective   Patient ID: Jaida Malcolm is a 73 y.o. female who presents for Earache (Bilateral ) and Fatigue.  Earache   Pertinent negatives include no abdominal pain, coughing, diarrhea or vomiting.   Fatigue  Associated symptoms include fatigue. Pertinent negatives include no abdominal pain, arthralgias, chest pain, coughing, nausea or vomiting.   She is here today for evaluation of 2 concerns.  1 is that of her ears.  She states her ears sometimes ache and she is also experiencing tinnitus.  We are reminded that she is to see Dr. Bailey and in fact she had sinus surgery in the past but she is no longer able to see him because of insurance reasons.  We talked about seeing a different provider that is covered on her plan and she is willing to travel.  On today's exam when I look in her ears there is no cerumen obstruction but she does have scarring in both of her eardrums.  We will help facilitate a referral.  She continues to use her Flonase and also her Claritin.  She also states she is just been feeling exhausted.  She states that she goes to bed every night but she has quite fragmented sleep.  She states she tosses and turns and she does try to get back to sleep but has difficulties.  She had lab work done just a few months ago that was normal.  I am going to check a CBC and thyroid blood test however because she has not had that checked for a long time and I just want a make sure there is nothing physical going on.  In the meantime I am going to give her a low-dose of nortriptyline to take at bedtime to see if this will help with her fragmented sleep and I will see her back in follow-up in approximately 2 weeks.  Review of Systems   Constitutional:  Positive for fatigue. Negative for unexpected weight change.   HENT:  Positive for ear pain.    Respiratory:  Negative for cough, chest tightness, shortness of breath and wheezing.    Cardiovascular:  Negative for chest pain, palpitations and leg swelling.    Gastrointestinal:  Negative for abdominal pain, blood in stool, diarrhea, nausea and vomiting.   Musculoskeletal:  Negative for arthralgias and back pain.     Objective   Physical Exam  Vitals and nursing note reviewed.   Constitutional:       General: She is not in acute distress.     Appearance: Normal appearance.   HENT:      Head: Normocephalic and atraumatic.      Right Ear: External ear normal.      Left Ear: External ear normal.   Eyes:      Conjunctiva/sclera: Conjunctivae normal.   Cardiovascular:      Rate and Rhythm: Normal rate and regular rhythm.      Heart sounds: Normal heart sounds.   Pulmonary:      Effort: No respiratory distress.      Breath sounds: No wheezing.   Abdominal:      Palpations: Abdomen is soft.      Tenderness: There is no abdominal tenderness. There is no guarding.   Musculoskeletal:         General: No swelling. Normal range of motion.   Skin:     General: Skin is warm and dry.   Neurological:      General: No focal deficit present.      Mental Status: She is alert and oriented to person, place, and time.   Psychiatric:         Behavior: Behavior normal.         Assessment/Plan   Problem List Items Addressed This Visit             ICD-10-CM    PND (post-nasal drip) R09.82    Relevant Medications    montelukast (Singulair) 10 mg tablet    Other fatigue R53.83     -She will have a TSH and CBC done today before leaving  -I believe that her fragmented sleep might be a culprit so we will try to treat that and see her back in follow-up  -Also sinus disease can cause some fatigue due to abnormal breathing patterns while sleeping at night         Relevant Orders    CBC and Auto Differential    TSH    Tinnitus of both ears - Primary H93.13     -We are referring her to ENT for her tinnitus and sinus disease         Relevant Orders    Referral to ENT    Sleep difficulties G47.9     -We will start nortriptyline at 10 mg at bedtime and see her back in 2 weeks for follow-up         Relevant  Medications    nortriptyline (Pamelor) 10 mg capsule          Lynette Meeks, DO

## 2024-05-21 NOTE — PATIENT INSTRUCTIONS
As we discussed I am going to refer you to ENT because of your chronic sinus issues and ringing in the ears.  I am sorry that you are unable to see Dr. Bailey as he originally did your surgery and is quite familiar with you but we understand about insurance so they will try to get another provider as close to [employer] is possible  In the meantime I have sent a new medication for your sinuses and allergies called Singulair and please try this once a day to see if it works  For your fatigue I have ordered a CBC and thyroid blood test which you can get done today because it does not require fasting  I do believe that your fatigue may be from fragmented sleep so I sent a medication to your pharmacy called nortriptyline at a very tiny dose of 10 mg.  Please take this about 1/2-hour prior to your bedtime and lets see if this helps you sleep better.  I will see you back in 2 weeks

## 2024-06-04 ENCOUNTER — OFFICE VISIT (OUTPATIENT)
Dept: PRIMARY CARE | Facility: CLINIC | Age: 74
End: 2024-06-04
Payer: COMMERCIAL

## 2024-06-04 VITALS
HEIGHT: 62 IN | BODY MASS INDEX: 21.35 KG/M2 | HEART RATE: 82 BPM | SYSTOLIC BLOOD PRESSURE: 128 MMHG | OXYGEN SATURATION: 98 % | WEIGHT: 116 LBS | DIASTOLIC BLOOD PRESSURE: 76 MMHG

## 2024-06-04 DIAGNOSIS — Z12.31 ENCOUNTER FOR SCREENING MAMMOGRAM FOR MALIGNANT NEOPLASM OF BREAST: Primary | ICD-10-CM

## 2024-06-04 DIAGNOSIS — G47.09 OTHER INSOMNIA: ICD-10-CM

## 2024-06-04 PROBLEM — R53.83 OTHER FATIGUE: Status: RESOLVED | Noted: 2023-07-27 | Resolved: 2024-06-04

## 2024-06-04 PROBLEM — K64.9 ACUTE HEMORRHOID: Status: RESOLVED | Noted: 2024-01-30 | Resolved: 2024-06-04

## 2024-06-04 PROBLEM — G47.9 SLEEP DIFFICULTIES: Status: RESOLVED | Noted: 2024-05-21 | Resolved: 2024-06-04

## 2024-06-04 PROCEDURE — 1036F TOBACCO NON-USER: CPT | Performed by: INTERNAL MEDICINE

## 2024-06-04 PROCEDURE — 1160F RVW MEDS BY RX/DR IN RCRD: CPT | Performed by: INTERNAL MEDICINE

## 2024-06-04 PROCEDURE — 1159F MED LIST DOCD IN RCRD: CPT | Performed by: INTERNAL MEDICINE

## 2024-06-04 PROCEDURE — 99213 OFFICE O/P EST LOW 20 MIN: CPT | Performed by: INTERNAL MEDICINE

## 2024-06-04 NOTE — PATIENT INSTRUCTIONS
I am glad that you have an appointment to see ENT  I took Singulair and nortriptyline off your medication list  Please read the handout I gave you today on insomnia and actually we did 3 times and follow all of the advised that he gives on improving her sleep habits.  It might take a week or 2 to get on a schedule so do not get frustrated when is not working in the very beginning and again the better you are and following these directions the better off you will be with your sleep  As we discussed in detail there are prescription medications I can give you for insomnia if this does not work   The staff will be helping you to schedule your mammogram later this summer and I will see you back as previously planned

## 2024-06-04 NOTE — PROGRESS NOTES
Subjective   Patient ID: Jaida Malcolm is a 73 y.o. female who presents for Follow-up (2 week check).  HPI  She is here today for her 2-week follow-up visit.  Because of problems with her sleep patterns and insomnia I have prescribed a very low-dose of nortriptyline.  She states she took it as directed but it almost made her feel more restless.  We talked about insomnia and we talked about sleep hygiene.  She does resort to napping in the daytime when she does not sleep well at night.  We discussed how avoiding naps will hopefully help her sleep really well the following evening and get her back on the schedule.  I am giving her a handout again on insomnia and asked that she read over carefully and follow all the directions that it has for improving sleep habits.  If it is not helpful we did talk about trying a different prescription such as trazodone.  She understands that the sleep aids do have their advantages and disadvantages for side effects and we discussed those today.  Also we did run a CBC and a thyroid blood test.  They look fine.  Her white cell count was off by just 4/10 and we will monitor periodically.  She also is agreeable to getting her screening mammogram scheduled for later this summer  She tried Singulair but she states it was not successful with her symptoms.  I am pleased to hear she does have an appoint with Dr. Morocho coming up in about a month from now.  Objective   Physical Exam  Vitals and nursing note reviewed.   Constitutional:       General: She is not in acute distress.     Appearance: Normal appearance.   HENT:      Head: Normocephalic and atraumatic.   Eyes:      Conjunctiva/sclera: Conjunctivae normal.   Cardiovascular:      Rate and Rhythm: Normal rate and regular rhythm.      Heart sounds: Normal heart sounds.   Pulmonary:      Effort: No respiratory distress.      Breath sounds: No wheezing.   Abdominal:      Palpations: Abdomen is soft.      Tenderness: There is no abdominal  tenderness. There is no guarding.   Musculoskeletal:         General: No swelling. Normal range of motion.   Skin:     General: Skin is warm and dry.   Neurological:      General: No focal deficit present.      Mental Status: She is alert and oriented to person, place, and time.   Psychiatric:         Behavior: Behavior normal.       Recent Results (from the past 336 hour(s))   CBC and Auto Differential    Collection Time: 05/21/24  9:11 AM   Result Value Ref Range    WBC 4.0 (L) 4.4 - 11.3 x10*3/uL    nRBC 0.0 0.0 - 0.0 /100 WBCs    RBC 4.12 4.00 - 5.20 x10*6/uL    Hemoglobin 12.9 12.0 - 16.0 g/dL    Hematocrit 39.1 36.0 - 46.0 %    MCV 95 80 - 100 fL    MCH 31.3 26.0 - 34.0 pg    MCHC 33.0 32.0 - 36.0 g/dL    RDW 13.0 11.5 - 14.5 %    Platelets 233 150 - 450 x10*3/uL    Neutrophils % 54.4 40.0 - 80.0 %    Immature Granulocytes %, Automated 0.0 0.0 - 0.9 %    Lymphocytes % 31.0 13.0 - 44.0 %    Monocytes % 12.2 2.0 - 10.0 %    Eosinophils % 1.7 0.0 - 6.0 %    Basophils % 0.7 0.0 - 2.0 %    Neutrophils Absolute 2.19 1.60 - 5.50 x10*3/uL    Immature Granulocytes Absolute, Automated 0.00 0.00 - 0.50 x10*3/uL    Lymphocytes Absolute 1.25 0.80 - 3.00 x10*3/uL    Monocytes Absolute 0.49 0.05 - 0.80 x10*3/uL    Eosinophils Absolute 0.07 0.00 - 0.40 x10*3/uL    Basophils Absolute 0.03 0.00 - 0.10 x10*3/uL   TSH    Collection Time: 05/21/24  9:11 AM   Result Value Ref Range    Thyroid Stimulating Hormone 3.34 0.44 - 3.98 mIU/L       Assessment/Plan   Problem List Items Addressed This Visit             ICD-10-CM    Insomnia G47.00     -We tried nortriptyline but she did not tolerate it very well  -I am giving her handout again on sleep hygiene and she will make some changes with her sleep patterns to see if this is successful in helping with her insomnia.  She will call if it is not  -She states she does sometimes get nauseated when she is tired and when she gets good rest she is no longer nauseated.         Encounter for  screening mammogram for malignant neoplasm of breast - Primary Z12.31    Relevant Orders    BI mammo bilateral screening tomosynthesis          Lynette Meeks, DO

## 2024-06-04 NOTE — ASSESSMENT & PLAN NOTE
-We tried nortriptyline but she did not tolerate it very well  -I am giving her handout again on sleep hygiene and she will make some changes with her sleep patterns to see if this is successful in helping with her insomnia.  She will call if it is not  -She states she does sometimes get nauseated when she is tired and when she gets good rest she is no longer nauseated.

## 2024-07-16 DIAGNOSIS — E78.2 MIXED HYPERLIPIDEMIA: ICD-10-CM

## 2024-07-16 DIAGNOSIS — K21.9 CHRONIC GERD: ICD-10-CM

## 2024-07-16 DIAGNOSIS — R09.82 PND (POST-NASAL DRIP): ICD-10-CM

## 2024-07-16 DIAGNOSIS — M81.0 OSTEOPOROSIS, UNSPECIFIED OSTEOPOROSIS TYPE, UNSPECIFIED PATHOLOGICAL FRACTURE PRESENCE: ICD-10-CM

## 2024-07-16 RX ORDER — FLUTICASONE PROPIONATE 50 MCG
SPRAY, SUSPENSION (ML) NASAL
Qty: 16 G | Refills: 5 | Status: SHIPPED | OUTPATIENT
Start: 2024-07-16

## 2024-07-16 RX ORDER — IBANDRONATE SODIUM 150 MG/1
150 TABLET, FILM COATED ORAL
Qty: 1 TABLET | Refills: 11 | Status: SHIPPED | OUTPATIENT
Start: 2024-07-16

## 2024-07-16 RX ORDER — FAMOTIDINE 20 MG/1
20 TABLET, FILM COATED ORAL 2 TIMES DAILY
Qty: 60 TABLET | Refills: 5 | Status: SHIPPED | OUTPATIENT
Start: 2024-07-16

## 2024-07-16 RX ORDER — PRAVASTATIN SODIUM 20 MG/1
20 TABLET ORAL DAILY
Qty: 30 TABLET | Refills: 5 | Status: SHIPPED | OUTPATIENT
Start: 2024-07-16

## 2024-07-23 ENCOUNTER — APPOINTMENT (OUTPATIENT)
Dept: VASCULAR MEDICINE | Facility: HOSPITAL | Age: 74
End: 2024-07-23
Payer: COMMERCIAL

## 2024-07-25 ENCOUNTER — HOSPITAL ENCOUNTER (OUTPATIENT)
Dept: VASCULAR MEDICINE | Facility: HOSPITAL | Age: 74
Discharge: HOME | End: 2024-07-25
Payer: COMMERCIAL

## 2024-07-25 DIAGNOSIS — I65.23 STENOSIS OF BOTH EXTERNAL CAROTID ARTERIES: ICD-10-CM

## 2024-07-25 DIAGNOSIS — R09.89 OTHER SPECIFIED SYMPTOMS AND SIGNS INVOLVING THE CIRCULATORY AND RESPIRATORY SYSTEMS: ICD-10-CM

## 2024-07-25 PROCEDURE — 93880 EXTRACRANIAL BILAT STUDY: CPT | Performed by: STUDENT IN AN ORGANIZED HEALTH CARE EDUCATION/TRAINING PROGRAM

## 2024-07-25 PROCEDURE — 93880 EXTRACRANIAL BILAT STUDY: CPT

## 2024-07-30 ENCOUNTER — LAB (OUTPATIENT)
Dept: LAB | Facility: LAB | Age: 74
End: 2024-07-30
Payer: COMMERCIAL

## 2024-07-30 ENCOUNTER — APPOINTMENT (OUTPATIENT)
Dept: PRIMARY CARE | Facility: CLINIC | Age: 74
End: 2024-07-30
Payer: COMMERCIAL

## 2024-07-30 ENCOUNTER — APPOINTMENT (OUTPATIENT)
Dept: VASCULAR MEDICINE | Facility: HOSPITAL | Age: 74
End: 2024-07-30
Payer: COMMERCIAL

## 2024-07-30 DIAGNOSIS — R69 TAKING MEDICATION FOR CHRONIC DISEASE: ICD-10-CM

## 2024-07-30 LAB
ANION GAP SERPL CALC-SCNC: 10 MMOL/L (ref 10–20)
BUN SERPL-MCNC: 19 MG/DL (ref 6–23)
CALCIUM SERPL-MCNC: 9.2 MG/DL (ref 8.6–10.3)
CHLORIDE SERPL-SCNC: 108 MMOL/L (ref 98–107)
CO2 SERPL-SCNC: 27 MMOL/L (ref 21–32)
CREAT SERPL-MCNC: 0.85 MG/DL (ref 0.5–1.05)
EGFRCR SERPLBLD CKD-EPI 2021: 72 ML/MIN/1.73M*2
GLUCOSE SERPL-MCNC: 85 MG/DL (ref 74–99)
POTASSIUM SERPL-SCNC: 4 MMOL/L (ref 3.5–5.3)
SODIUM SERPL-SCNC: 141 MMOL/L (ref 136–145)

## 2024-07-30 PROCEDURE — 36415 COLL VENOUS BLD VENIPUNCTURE: CPT

## 2024-07-30 PROCEDURE — 80048 BASIC METABOLIC PNL TOTAL CA: CPT

## 2024-08-05 ENCOUNTER — HOSPITAL ENCOUNTER (OUTPATIENT)
Dept: RADIOLOGY | Facility: CLINIC | Age: 74
Discharge: HOME | End: 2024-08-05
Payer: COMMERCIAL

## 2024-08-05 VITALS — BODY MASS INDEX: 21.34 KG/M2 | WEIGHT: 115.98 LBS | HEIGHT: 62 IN

## 2024-08-05 DIAGNOSIS — Z12.31 ENCOUNTER FOR SCREENING MAMMOGRAM FOR MALIGNANT NEOPLASM OF BREAST: ICD-10-CM

## 2024-08-05 PROCEDURE — 77067 SCR MAMMO BI INCL CAD: CPT | Performed by: RADIOLOGY

## 2024-08-05 PROCEDURE — 77067 SCR MAMMO BI INCL CAD: CPT

## 2024-08-05 PROCEDURE — 77063 BREAST TOMOSYNTHESIS BI: CPT | Performed by: RADIOLOGY

## 2024-08-06 ENCOUNTER — APPOINTMENT (OUTPATIENT)
Age: 74
End: 2024-08-06
Payer: COMMERCIAL

## 2024-08-06 VITALS
SYSTOLIC BLOOD PRESSURE: 118 MMHG | DIASTOLIC BLOOD PRESSURE: 70 MMHG | OXYGEN SATURATION: 98 % | HEART RATE: 80 BPM | WEIGHT: 112 LBS | BODY MASS INDEX: 20.61 KG/M2 | HEIGHT: 62 IN

## 2024-08-06 DIAGNOSIS — K21.9 CHRONIC GERD: ICD-10-CM

## 2024-08-06 DIAGNOSIS — G47.09 OTHER INSOMNIA: ICD-10-CM

## 2024-08-06 DIAGNOSIS — M81.0 OSTEOPOROSIS, UNSPECIFIED OSTEOPOROSIS TYPE, UNSPECIFIED PATHOLOGICAL FRACTURE PRESENCE: ICD-10-CM

## 2024-08-06 DIAGNOSIS — K59.09 CHRONIC CONSTIPATION: ICD-10-CM

## 2024-08-06 DIAGNOSIS — R69 TAKING MEDICATION FOR CHRONIC DISEASE: ICD-10-CM

## 2024-08-06 DIAGNOSIS — E78.2 MIXED HYPERLIPIDEMIA: ICD-10-CM

## 2024-08-06 DIAGNOSIS — I65.23 STENOSIS OF BOTH EXTERNAL CAROTID ARTERIES: Primary | ICD-10-CM

## 2024-08-06 PROBLEM — Z12.31 ENCOUNTER FOR SCREENING MAMMOGRAM FOR MALIGNANT NEOPLASM OF BREAST: Status: RESOLVED | Noted: 2024-06-04 | Resolved: 2024-08-06

## 2024-08-06 RX ORDER — IBANDRONATE SODIUM 150 MG/1
150 TABLET, FILM COATED ORAL
Qty: 1 TABLET | Refills: 11 | Status: SHIPPED | OUTPATIENT
Start: 2024-08-06

## 2024-08-06 RX ORDER — DOCUSATE SODIUM 100 MG/1
100 CAPSULE, LIQUID FILLED ORAL 2 TIMES DAILY
Qty: 60 CAPSULE | Refills: 5 | Status: SHIPPED | OUTPATIENT
Start: 2024-08-06

## 2024-08-06 RX ORDER — FAMOTIDINE 20 MG/1
20 TABLET, FILM COATED ORAL 2 TIMES DAILY
Qty: 60 TABLET | Refills: 5 | Status: SHIPPED | OUTPATIENT
Start: 2024-08-06

## 2024-08-06 RX ORDER — PRAVASTATIN SODIUM 20 MG/1
20 TABLET ORAL DAILY
Qty: 30 TABLET | Refills: 5 | Status: SHIPPED | OUTPATIENT
Start: 2024-08-06

## 2024-08-06 ASSESSMENT — ENCOUNTER SYMPTOMS
ARTHRALGIAS: 0
SHORTNESS OF BREATH: 0
BACK PAIN: 0
PALPITATIONS: 0
BLOOD IN STOOL: 0
VOMITING: 0
NAUSEA: 0
COUGH: 0
WHEEZING: 0
FATIGUE: 0
ABDOMINAL PAIN: 0
DIARRHEA: 0

## 2024-08-06 ASSESSMENT — PATIENT HEALTH QUESTIONNAIRE - PHQ9
1. LITTLE INTEREST OR PLEASURE IN DOING THINGS: NOT AT ALL
2. FEELING DOWN, DEPRESSED OR HOPELESS: NOT AT ALL
SUM OF ALL RESPONSES TO PHQ9 QUESTIONS 1 AND 2: 0

## 2024-08-06 NOTE — ASSESSMENT & PLAN NOTE
-Her most recent carotid studies do not show significant progression of disease and we need to continue to aggressively modify risk factors which include keeping her cholesterol under good control

## 2024-08-06 NOTE — ASSESSMENT & PLAN NOTE
-She will remain on her pravastatin and she will be getting a fasting lipid profile just prior to her next follow-up visit

## 2024-08-06 NOTE — PATIENT INSTRUCTIONS
As we discussed I sent refills for all of your medications including your Colace  Please do not hesitate to reach out if you have any problems between now and your next follow-up appointment  Please remember to seek out the flu vaccine when it becomes available in the next month or 2  We will see you back in 6 months and please remember to get fasting lab work done prior to that visit

## 2024-08-06 NOTE — PROGRESS NOTES
Subjective   Patient ID: Jaida Malcolm is a 73 y.o. female who presents for Follow-up (6 MO FUV).  HPI  She is here today for her routine 6-month checkup.  The good news is she was able to see ENT, Dr. Morocho because he is on her insurance plan and she is having a reassessment of her severe sinus disease.  We are reminded that sometime ago when she saw Dr. Bailey she had a CT scan which showed some anatomical issues involving her sinus cavities.  At the time it was recommended she have surgery but it would not have been covered due to no insurance coverage for the provider.  She states she is now having a reassessment of her sinuses and is looking forward to getting some resolution of her symptoms.  We also briefly discussed her chronic constipation and she would like to remain on Colace 100 mg.  She takes 2 a day and she states that does the trick.  We also reviewed her carotid arteries Dopplers the good news is she has not progressed with her stenosis.  We discussed the importance of remaining off the cholesterol medication to prevent further progression.  She also appears to be doing well with her famotidine and we are providing a refill today as well.  We talked about her insomnia again and we talked about doing the recommendations from the handout we gave her last time.  She states she is still having some problems sleeping but at this point does not want a sleeping aid.  We talked about getting this season's flu vaccine and if everything goes according to plan we will see her back in 6 months for another checkup with fasting lab work.  Review of Systems   Constitutional:  Negative for fatigue.   Respiratory:  Negative for cough, shortness of breath and wheezing.    Cardiovascular:  Negative for chest pain, palpitations and leg swelling.   Gastrointestinal:  Negative for abdominal pain, blood in stool, diarrhea, nausea and vomiting.   Musculoskeletal:  Negative for arthralgias and back pain.     Objective    Physical Exam  Vitals and nursing note reviewed.   Constitutional:       General: She is not in acute distress.     Appearance: Normal appearance.   HENT:      Head: Normocephalic and atraumatic.   Eyes:      Conjunctiva/sclera: Conjunctivae normal.   Cardiovascular:      Rate and Rhythm: Normal rate and regular rhythm.      Heart sounds: Normal heart sounds.   Pulmonary:      Effort: No respiratory distress.      Breath sounds: No wheezing.   Abdominal:      Palpations: Abdomen is soft.      Tenderness: There is no abdominal tenderness. There is no guarding.   Musculoskeletal:         General: No swelling. Normal range of motion.   Skin:     General: Skin is warm and dry.   Neurological:      General: No focal deficit present.      Mental Status: She is alert and oriented to person, place, and time.   Psychiatric:         Behavior: Behavior normal.       Recent Results (from the past 1008 hour(s))   Basic Metabolic Panel    Collection Time: 07/30/24  7:07 AM   Result Value Ref Range    Glucose 85 74 - 99 mg/dL    Sodium 141 136 - 145 mmol/L    Potassium 4.0 3.5 - 5.3 mmol/L    Chloride 108 (H) 98 - 107 mmol/L    Bicarbonate 27 21 - 32 mmol/L    Anion Gap 10 10 - 20 mmol/L    Urea Nitrogen 19 6 - 23 mg/dL    Creatinine 0.85 0.50 - 1.05 mg/dL    eGFR 72 >60 mL/min/1.73m*2    Calcium 9.2 8.6 - 10.3 mg/dL       Assessment/Plan   Problem List Items Addressed This Visit             ICD-10-CM    Mixed hyperlipidemia E78.2     -She will remain on her pravastatin and she will be getting a fasting lipid profile just prior to her next follow-up visit         Relevant Medications    pravastatin (Pravachol) 20 mg tablet    Other Relevant Orders    Lipid Panel    Osteoporosis M81.0     -She will continue taking Boniva and we encouraged her to also take calcium and vitamin D and do regular weightbearing exercises  -Also encouraged her to be careful about falls         Relevant Medications    ibandronate (Boniva) 150 mg tablet     Stenosis of both external carotid arteries - Primary I65.23     -Her most recent carotid studies do not show significant progression of disease and we need to continue to aggressively modify risk factors which include keeping her cholesterol under good control         Chronic constipation K59.09     -She has done well but taking Colace 100 mg 2 capsules daily and we will continue to monitor         Relevant Medications    docusate sodium (Colace) 100 mg capsule    Chronic GERD K21.9     -Stable at this time on famotidine         Relevant Medications    famotidine (Pepcid) 20 mg tablet    Insomnia G47.00     Encouraged her to continue following the sleep hygiene guidelines given to her last time         Taking medication for chronic disease R69    Relevant Orders    Basic Metabolic Panel          Lynette Meeks,

## 2024-08-06 NOTE — ASSESSMENT & PLAN NOTE
-She will continue taking Boniva and we encouraged her to also take calcium and vitamin D and do regular weightbearing exercises  -Also encouraged her to be careful about falls

## 2024-08-14 DIAGNOSIS — B37.2 YEAST DERMATITIS: ICD-10-CM

## 2024-08-14 DIAGNOSIS — R09.82 PND (POST-NASAL DRIP): ICD-10-CM

## 2024-08-16 RX ORDER — LORATADINE 10 MG/1
10 TABLET ORAL DAILY
Qty: 30 TABLET | Refills: 11 | Status: SHIPPED | OUTPATIENT
Start: 2024-08-16

## 2024-08-16 RX ORDER — NYSTATIN 100000 U/G
OINTMENT TOPICAL
Qty: 15 G | Refills: 5 | Status: SHIPPED | OUTPATIENT
Start: 2024-08-16

## 2024-09-12 ENCOUNTER — TELEPHONE (OUTPATIENT)
Age: 74
End: 2024-09-12
Payer: COMMERCIAL

## 2024-09-12 NOTE — TELEPHONE ENCOUNTER
Jaida called and left . She said she had an EKG done at [facility] for her upcoming septoplastic surgery. It said it was abnormal. She was wondering if you could look at it and see if there was any concerns? However, I do not see it in her chart anywhere at this time.

## 2024-09-12 NOTE — TELEPHONE ENCOUNTER
A couple of questions.  Can we find out who ordered the EKG?  It sounds like she was having a preoperative assessment.  If so then theoretically she would see the practitioner who is during her preop clearance evaluation.  If she is not being seen by somebody then please ask her to kindly make an appointment and we can try and track down the EKG.  Thank you!

## 2025-01-15 ENCOUNTER — EVALUATION (OUTPATIENT)
Dept: PHYSICAL THERAPY | Facility: CLINIC | Age: 75
End: 2025-01-15
Payer: COMMERCIAL

## 2025-01-15 DIAGNOSIS — M75.22 BICIPITAL TENDINITIS, LEFT SHOULDER: ICD-10-CM

## 2025-01-15 DIAGNOSIS — M75.52 BURSITIS OF LEFT SHOULDER: ICD-10-CM

## 2025-01-15 PROCEDURE — 97161 PT EVAL LOW COMPLEX 20 MIN: CPT | Mod: GP | Performed by: PHYSICAL THERAPIST

## 2025-01-15 PROCEDURE — 97110 THERAPEUTIC EXERCISES: CPT | Mod: GP | Performed by: PHYSICAL THERAPIST

## 2025-01-15 ASSESSMENT — ENCOUNTER SYMPTOMS
DEPRESSION: 0
LOSS OF SENSATION IN FEET: 0
OCCASIONAL FEELINGS OF UNSTEADINESS: 0

## 2025-01-15 ASSESSMENT — PATIENT HEALTH QUESTIONNAIRE - PHQ9
2. FEELING DOWN, DEPRESSED OR HOPELESS: NOT AT ALL
SUM OF ALL RESPONSES TO PHQ9 QUESTIONS 1 AND 2: 0
1. LITTLE INTEREST OR PLEASURE IN DOING THINGS: NOT AT ALL

## 2025-01-15 ASSESSMENT — PAIN SCALES - GENERAL: PAINLEVEL_OUTOF10: 7

## 2025-01-15 ASSESSMENT — PAIN - FUNCTIONAL ASSESSMENT: PAIN_FUNCTIONAL_ASSESSMENT: 0-10

## 2025-01-15 NOTE — PROGRESS NOTES
Physical Therapy Evaluation and Treatment      Patient Name: Jaida Malcolm  MRN: [id number]  Today's Date: 1/15/2025  Time Calculation  Start Time: 0920  Stop Time: 1000  Time Calculation (min): 40 min    PT Evaluation Time Entry  PT Evaluation (Low) Time Entry: 29   PT Therapeutic Procedures Time Entry  Therapeutic Exercise Time Entry: 10                         Assessment:  PT Assessment Results: Decreased strength, Decreased range of motion, Decreased mobility, Pain  Rehab Prognosis: Good  Barriers to Participation:  (None)    The pt presents with Medical Dx of  Bursitis of L Shoulder, Bicipital Tendinitis of L Shoulder.  Pt presents with increased pain, decreased strength, ROM/flexibility and functional mobility.  Pt would benefit from PT services from PT services in order to improve on these deficits and to maximize ability to reach out with UE, to reach UE overhead, lift objects overhead and functional activity/mobility.      Plan:  Treatment/Interventions: Cryotherapy, Education/ Instruction, Hot pack, Manual therapy, Therapeutic activities, Therapeutic exercises (Manual Therapy including IASTM as needed.)  PT Plan: Skilled PT  PT Frequency:  (2x/wk for 4 weeks or 9 sessions.  Need auth after 8 sessions)  Rehab Potential: Good  Plan of Care Agreement: Patient (Patient Goal:  Improve pain and use of R UE)    Current Problem:   Problem List Items Addressed This Visit             ICD-10-CM       Musculoskeletal and Injuries    Bicipital tendinitis, left shoulder M75.22    Relevant Orders    Follow Up In Physical Therapy    Bursitis of left shoulder M75.52    Relevant Orders    Follow Up In Physical Therapy       Subjective   Pt reports reports that she has had L shoulder pain for over a year with no specific mechanism of injury.  Pt reports that her pain has gotten worse the last 3-4 weeks.  Pt had a recent injection in her L shoulder but that it did not seem to help much.  Pt reports that her pain is worse with  driving with her L arm, reaching or lifting objects and improves with rest, not moving the arm and over the counter medication as needed.     General  Reason for Referral: L shoulder issue  Referred By: Elly Simpson CNP  General Comment: Visit # 1/9  Needs auth after 8 sessions  Visit #1    Precautions:  Precautions  Precautions Comment: Low Fall Risk.  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    Pain:  Pain Assessment  Pain Assessment: 0-10  0-10 (Numeric) Pain Score: 7 (L shoulder pain range 0-8/10)  Pain Type: Chronic pain  Pain Location: Shoulder  Pain Orientation: Left    Home Living:   Pt lives alone in a 1 story home.  2 entry steps with a grab bar.    Prior Level of Function:   Pt was I with all ADL's and IADL's prior.  Retired.      Objective   Observation:   FHP with rounded shoulders.    Palpation: Tender L anterior shoulder over bicipital groove.    Strength:                                                                     R-   Flex   5/5   Abd   5/5   ER   5/5   IR   5/5   Bicept   5/5   Tricept   5/5       L-   Flex   4-/5   Abd   4-/5   ER   4-/5   IR   4-/5   Bicept   4/5   Tricept   4/5      PROM:                 R-   WFL Throughout.     L-   Flex 140  deg     Abd 140  deg     ER  80 deg     IR  60 deg    Special Tests:               L  Empty Can  (+)  L  Speeds    (+)  L  Shepard Aman   (+)    Outcome Measures:  Other Measures  Disability of Arm Shoulder Hand (DASH): 27.27     Treatments:  Ther Ex:  10 min   1 unit  Shoulder PROM all planes   7 min  Pulley 3 min  HEP Instruction with pulley set given.    Resistive Rowing   2 x 10 reps  A  Resistive Shoulder Extension   2 x 10 reps  A  Resistive ER   2 x 10 reps  A  Resistive IR   2 x 10 reps  A        OP EDUCATION:   PT edu pt regarding PT POC/course of treatment and HEP.  Pt was given a pulley set to take  home.  Pt verbalized good understanding.      Goals:  Active       PT Problem       PT Goal 1       Start:  01/15/25    Expected End:  25       LTG's:  1) Improve  L shoulder strength  to >= 4+/5 throughout in order to facilitate ability to reach overhead and lift objects.      4-6 weeks      2) Improve  L shoulder PROM  to >= 160 deg flex/abd and 80 deg IR in order to better reach overhead or behind back.       4-6 weeks      3) Improve  L shoulder pain from   8/10 to <= 0-3/10 with activity in order to improve QOL.        4-6 weeks      4) Improve quick dash score by >= 5 points in order to improve QOL.    4-6 weeks      5) The pt will improve ability to raise arm/shoulder overhead, reach at various angles, lift/carry objects, dress upper body, overhead activity and return to exercise, and work around the home without significant limitation.    4-6 weeks        ST) Pt/caregiver will be I and consistent with HEP with use of handout as needed in order to maximize shoulder strength and ROM/flexibility.       2-3 weeks

## 2025-01-15 NOTE — LETTER
January 15, 2025    MARIA VICTORIA Echevarria  [address] [facility] [address]  [place] [address]    Patient: Jaida Malcolm   YOB: 1950   Date of Visit: 1/15/2025       Dear MARIA VICTORIA Echevarria  [address] [facility] [address]  [place] [address]    The attached plan of care is being sent to you because your patient’s medical reimbursement requires that you certify the plan of care. Your signature is required to allow uninterrupted insurance coverage.      You may indicate your approval by signing below and faxing this form back to us at Dept Fax: [phone].    Please call Dept: [phone] with any questions or concerns.    Thank you for this referral,        Chemo Coelho, PT  [address] [address]    Payer: Payor: [facility] PLAN / Plan: [facility] PLAN / Product Type: *No Product type* /                                                                         Date:     Dear Chemo Coelho, PT,     Re: Ms. Jaida Malcolm, MRN:[id number]    I certify that I have reviewed the attached plan of care and it is medically necessary for Ms. Jaida Malcolm (1950) who is under my care.          ______________________________________                    _________________  Provider name and credentials                                           Date and time                                                                                           Plan of Care 1/15/25   Effective from: 1/15/2025  Effective to: 2/17/2025    Plan ID: [id number]            Participants as of Finalize on 1/15/2025    Name Type Comments Contact Info    MARIA VICTORIA Echevarria Consulting Physician  [phone]    Chemo Coelho, PT Physical Therapist  [phone]       Last Plan Note     Author: Chemo Coelho PT Status: Signed Last edited: 1/15/2025  9:15 AM       Physical Therapy Evaluation and Treatment       Patient Name: Jaida Malcolm  MRN: [id number]  Today's Date: 1/15/2025  Time Calculation  Start Time: 0920  Stop Time: 1000  Time Calculation (min): 40 min    PT Evaluation Time Entry  PT Evaluation (Low) Time Entry: 29   PT Therapeutic Procedures Time Entry  Therapeutic Exercise Time Entry: 10                         Assessment:  PT Assessment Results: Decreased strength, Decreased range of motion, Decreased mobility, Pain  Rehab Prognosis: Good  Barriers to Participation:  (None)    The pt presents with Medical Dx of  Bursitis of L Shoulder, Bicipital Tendinitis of L Shoulder.  Pt presents with increased pain, decreased strength, ROM/flexibility and functional mobility.  Pt would benefit from PT services from PT services in order to improve on these deficits and to maximize ability to reach out with UE, to reach UE overhead, lift objects overhead and functional activity/mobility.      Plan:  Treatment/Interventions: Cryotherapy, Education/ Instruction, Hot pack, Manual therapy, Therapeutic activities, Therapeutic exercises (Manual Therapy including IASTM as needed.)  PT Plan: Skilled PT  PT Frequency:  (2x/wk for 4 weeks or 9 sessions.  Need auth after 8 sessions)  Rehab Potential: Good  Plan of Care Agreement: Patient (Patient Goal:  Improve pain and use of R UE)    Current Problem:   Problem List Items Addressed This Visit             ICD-10-CM       Musculoskeletal and Injuries    Bicipital tendinitis, left shoulder M75.22    Relevant Orders    Follow Up In Physical Therapy    Bursitis of left shoulder M75.52    Relevant Orders    Follow Up In Physical Therapy       Subjective   Pt reports reports that she has had L shoulder pain for over a year with no specific mechanism of injury.  Pt reports that her pain has gotten worse the last 3-4 weeks.  Pt had a recent injection in her L shoulder but that it did not seem to help much.  Pt reports that her pain is worse with driving with her L arm, reaching or lifting  objects and improves with rest, not moving the arm and over the counter medication as needed.     General  Reason for Referral: L shoulder issue  Referred By: Elly Simpson CNP  General Comment: Visit # 1/9  Needs auth after 8 sessions  Visit #1    Precautions:  Precautions  Precautions Comment: Low Fall Risk.  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    Pain:  Pain Assessment  Pain Assessment: 0-10  0-10 (Numeric) Pain Score: 7 (L shoulder pain range 0-8/10)  Pain Type: Chronic pain  Pain Location: Shoulder  Pain Orientation: Left    Home Living:   Pt lives alone in a 1 story home.  2 entry steps with a grab bar.    Prior Level of Function:   Pt was I with all ADL's and IADL's prior.  Retired.      Objective   Observation:   FHP with rounded shoulders.    Palpation: Tender L anterior shoulder over bicipital groove.    Strength:                                                                     R-   Flex   5/5   Abd   5/5   ER   5/5   IR   5/5   Bicept   5/5   Tricept   5/5       L-   Flex   4-/5   Abd   4-/5   ER   4-/5   IR   4-/5   Bicept   4/5   Tricept   4/5      PROM:                 R-   WFL Throughout.     L-   Flex 140  deg     Abd 140  deg     ER  80 deg     IR  60 deg    Special Tests:               L  Empty Can  (+)  L  Speeds    (+)  L  Shepard Aman   (+)    Outcome Measures:  Other Measures  Disability of Arm Shoulder Hand (DASH): 27.27     Treatments:  Ther Ex:  10 min   1 unit  Shoulder PROM all planes   7 min  Pulley 3 min  HEP Instruction with pulley set given.    Resistive Rowing   2 x 10 reps  A  Resistive Shoulder Extension   2 x 10 reps  A  Resistive ER   2 x 10 reps  A  Resistive IR   2 x 10 reps  A        OP EDUCATION:   PT edu pt regarding PT POC/course of treatment and HEP.  Pt was given a pulley set to take home.  Pt verbalized good  understanding.      Goals:  Active       PT Problem       PT Goal 1       Start:  01/15/25    Expected End:  25       LTG's:  1) Improve  L shoulder strength  to >= 4+/5 throughout in order to facilitate ability to reach overhead and lift objects.      4-6 weeks      2) Improve  L shoulder PROM  to >= 160 deg flex/abd and 80 deg IR in order to better reach overhead or behind back.       4-6 weeks      3) Improve  L shoulder pain from   8/10 to <= 0-3/10 with activity in order to improve QOL.        4-6 weeks      4) Improve quick dash score by >= 5 points in order to improve QOL.    4-6 weeks      5) The pt will improve ability to raise arm/shoulder overhead, reach at various angles, lift/carry objects, dress upper body, overhead activity and return to exercise, and work around the home without significant limitation.    4-6 weeks        ST) Pt/caregiver will be I and consistent with HEP with use of handout as needed in order to maximize shoulder strength and ROM/flexibility.       2-3 weeks                            Current Participants as of 1/15/2025    Name Type Comments Contact Info    Isamar Simpson, OZZIE-CNP Consulting Physician  [phone]    Signature pending    Chemo Coelho, PT Physical Therapist  [phone]

## 2025-01-20 DIAGNOSIS — R09.82 PND (POST-NASAL DRIP): ICD-10-CM

## 2025-01-20 DIAGNOSIS — K59.09 CHRONIC CONSTIPATION: ICD-10-CM

## 2025-01-20 DIAGNOSIS — E78.2 MIXED HYPERLIPIDEMIA: ICD-10-CM

## 2025-01-20 DIAGNOSIS — K21.9 CHRONIC GERD: ICD-10-CM

## 2025-01-20 RX ORDER — FAMOTIDINE 20 MG/1
20 TABLET, FILM COATED ORAL 2 TIMES DAILY
Qty: 60 TABLET | Refills: 5 | Status: SHIPPED | OUTPATIENT
Start: 2025-01-20

## 2025-01-20 RX ORDER — FLUTICASONE PROPIONATE 50 MCG
SPRAY, SUSPENSION (ML) NASAL
Qty: 16 G | Refills: 5 | Status: SHIPPED | OUTPATIENT
Start: 2025-01-20

## 2025-01-20 RX ORDER — PRAVASTATIN SODIUM 20 MG/1
20 TABLET ORAL DAILY
Qty: 30 TABLET | Refills: 5 | Status: SHIPPED | OUTPATIENT
Start: 2025-01-20

## 2025-01-20 RX ORDER — DOCUSATE SODIUM 100 MG/1
100 CAPSULE, LIQUID FILLED ORAL 2 TIMES DAILY
Qty: 60 CAPSULE | Refills: 5 | Status: SHIPPED | OUTPATIENT
Start: 2025-01-20

## 2025-01-21 ENCOUNTER — APPOINTMENT (OUTPATIENT)
Dept: PHYSICAL THERAPY | Facility: CLINIC | Age: 75
End: 2025-01-21
Payer: COMMERCIAL

## 2025-01-23 ENCOUNTER — APPOINTMENT (OUTPATIENT)
Dept: PHYSICAL THERAPY | Facility: CLINIC | Age: 75
End: 2025-01-23
Payer: COMMERCIAL

## 2025-01-28 ENCOUNTER — TREATMENT (OUTPATIENT)
Dept: PHYSICAL THERAPY | Facility: CLINIC | Age: 75
End: 2025-01-28
Payer: COMMERCIAL

## 2025-01-28 DIAGNOSIS — M75.22 BICIPITAL TENDINITIS, LEFT SHOULDER: ICD-10-CM

## 2025-01-28 DIAGNOSIS — M75.52 BURSITIS OF LEFT SHOULDER: ICD-10-CM

## 2025-01-28 PROCEDURE — 97110 THERAPEUTIC EXERCISES: CPT | Mod: GP,CQ

## 2025-01-28 ASSESSMENT — PAIN SCALES - GENERAL: PAINLEVEL_OUTOF10: 5 - MODERATE PAIN

## 2025-01-28 ASSESSMENT — PAIN - FUNCTIONAL ASSESSMENT: PAIN_FUNCTIONAL_ASSESSMENT: 0-10

## 2025-01-28 NOTE — PROGRESS NOTES
Physical Therapy Treatment    Patient Name: Jaida Malcolm  MRN: [id number]  Today's Date: 1/28/2025  Time Calculation  Start Time: 0916  Stop Time: 0958  Time Calculation (min): 42 min  PT Therapeutic Procedures Time Entry  Therapeutic Exercise Time Entry: 39         Current Problem  Problem List Items Addressed This Visit             ICD-10-CM    Bicipital tendinitis, left shoulder M75.22    Bursitis of left shoulder M75.52       Subjective   Pt.'s name and birthday confirmed.  Pt. Is compliant with HEP.  Pt. Reports left shoulder aches when she sleeps.  Pt. C/o 5/10 pain when trying to raise arm up and out (such as putting on her coat).  Pt. Also has been outside shoveling snow.    Reason for Referral: L shoulder issue  Referred By: Elly Simpson CNP  General Comment: Visit # 2/9  Needs auth after 8 sessions      Precautions  Precautions  Precautions Comment: Low Fall Risk.  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      Pain  Pain Assessment: 0-10  0-10 (Numeric) Pain Score: 5 - Moderate pain  Pain Type: Chronic pain  Pain Location: Shoulder  Pain Orientation: Left    Objective:  Treatments:  Shoulder PROM all planes   7 min  UBE lv x3 mins (N)  Pulley 3 min  Posterior shoulder rolls x10 (N)  Scapular retractions x10 (N)  Shoulder ER 0# x10 (N)  Wall slides flexion x10 (N)  Supine AAROM: Flex/Abd x10 ea (N)         Resistive Rowing   2 x 10 reps  A  Resistive Shoulder Extension   2 x 10 reps  A  Resistive ER   2 x 10 reps  A  Resistive IR   2 x 10 reps  A           OP EDUCATION:  PT edu pt regarding PT POC/course of treatment and HEP.  Pt was given a pulley set to take home.  Pt verbalized good understanding.      Access Code: [email or web address]  URL: [email or web address]  Date: 01/28/2025  Prepared by: Nell Hernandez  - Shoulder Flexion Wall Slide with  Towel  - 1 x daily - 7 x weekly - 2 sets - 10 reps  - Seated Scapular Retraction  - 1 x daily - 7 x weekly - 2 sets - 10 reps  - Standing Backward Shoulder Rolls  - 1 x daily - 7 x weekly - 2 sets - 10 reps  - Shoulder External Rotation and Scapular Retraction with Resistance  - 1 x daily - 7 x weekly - 2 sets - 10 reps  - Supine Shoulder Flexion with Dowel  - 1 x daily - 7 x weekly - 2 sets - 10 reps  - Supine Shoulder Abduction AAROM with Dowel  - 1 x daily - 7 x weekly - 2 sets - 10 reps      Assessment:  Fair tolerance noted with stretches/ROM.  Sx.'s noted with scaption/abduction.  Trial arm bike this date which she tolerated well.  End range discomfort with ER/Abd passively.  Handout provided and reviewed with patient.         Plan:  Continue with strengthening, ROM and flexibility per tolerance to improve daily activities with little to no difficulty.  MB       OP PT Plan  Treatment/Interventions: Cryotherapy, Education/ Instruction, Hot pack, Manual therapy, Therapeutic activities, Therapeutic exercises (Manual Therapy including IASTM as needed.)  PT Plan: Skilled PT  PT Frequency:  (2x/wk for 4 weeks or 9 sessions.  Need auth after 8 sessions)  Rehab Potential: Good  Plan of Care Agreement: Patient (Patient Goal:  Improve pain and use of R UE)              Goals:  Active       PT Problem       PT Goal 1       Start:  01/15/25    Expected End:  03/17/25       LTG's:  1) Improve  L shoulder strength  to >= 4+/5 throughout in order to facilitate ability to reach overhead and lift objects.      4-6 weeks      2) Improve  L shoulder PROM  to >= 160 deg flex/abd and 80 deg IR in order to better reach overhead or behind back.       4-6 weeks      3) Improve  L shoulder pain from   8/10 to <= 0-3/10 with activity in order to improve QOL.        4-6 weeks      4) Improve quick dash score by >= 5 points in order to improve QOL.    4-6 weeks      5) The pt will improve ability to raise arm/shoulder overhead, reach at  various angles, lift/carry objects, dress upper body, overhead activity and return to exercise, and work around the home without significant limitation.    4-6 weeks        ST) Pt/caregiver will be I and consistent with HEP with use of handout as needed in order to maximize shoulder strength and ROM/flexibility.       2-3 weeks

## 2025-01-30 ENCOUNTER — TREATMENT (OUTPATIENT)
Dept: PHYSICAL THERAPY | Facility: CLINIC | Age: 75
End: 2025-01-30
Payer: COMMERCIAL

## 2025-01-30 DIAGNOSIS — M75.22 BICIPITAL TENDINITIS, LEFT SHOULDER: ICD-10-CM

## 2025-01-30 DIAGNOSIS — M75.52 BURSITIS OF LEFT SHOULDER: ICD-10-CM

## 2025-01-30 PROCEDURE — 97110 THERAPEUTIC EXERCISES: CPT | Mod: GP,CQ

## 2025-01-30 ASSESSMENT — PAIN DESCRIPTION - DESCRIPTORS: DESCRIPTORS: ACHING;SORE

## 2025-01-30 ASSESSMENT — PAIN - FUNCTIONAL ASSESSMENT: PAIN_FUNCTIONAL_ASSESSMENT: 0-10

## 2025-01-30 ASSESSMENT — PAIN SCALES - GENERAL: PAINLEVEL_OUTOF10: 5 - MODERATE PAIN

## 2025-01-30 NOTE — PROGRESS NOTES
Physical Therapy Treatment    Patient Name: Jaida Malcolm  MRN: [id number]  Today's Date: 1/30/2025  Time Calculation  Start Time: 0915  Stop Time: 0958  Time Calculation (min): 43 min  PT Therapeutic Procedures Time Entry  Therapeutic Exercise Time Entry: 40         Current Problem  Problem List Items Addressed This Visit             ICD-10-CM    Bicipital tendinitis, left shoulder M75.22    Bursitis of left shoulder M75.52       Subjective   Pt.'s name and birthday confirmed.  Pt. Is 80% compliant with HEP.  Pt. Reports 5/10 sx.'s/soreness with shoulder.  States she did exercises at home and did not noticed increased soreness.  Also states she slept fairly well.  No reports of falls.    Reason for Referral: L shoulder issue  Referred By: Elly Simspon CNP  General Comment: Visit # 3/9  Needs auth after 8 sessions      Precautions  Precautions  Precautions Comment: Low Fall Risk.  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      Pain  Pain Assessment: 0-10  0-10 (Numeric) Pain Score: 5 - Moderate pain  Pain Type: Chronic pain  Pain Location: Shoulder  Pain Orientation: Left  Pain Descriptors: Aching, Sore    Objective:  Treatments:  Shoulder PROM all planes   7 min  UBE lv x2 mins Fwds/Bwds  (P)  Pulley 3 min  Posterior shoulder rolls x10   Scapular retractions x10   Shoulder ER 0# x10   Wall slides flexion 2 x10 (P)  Supine AAROM: Flex/Abd x10 ea   Supine AROM flexion x10 (N)   Resistive Rowing orange band x 10 reps  N  Resistive Shoulder Extension uni L orange band x10 reps N   Resistive ER   2 x 10 reps  A  Resistive IR   2 x 10 reps  A               OP EDUCATION:  PT edu pt regarding PT POC/course of treatment and HEP.  Pt was given a pulley set to take home.  Pt verbalized good understanding.      Access Code: [email or web address]  URL: [email or web address]  Date:  01/30/2025  Prepared by: Nell Black    Exercises  - Seated Single Arm Shoulder Row with Anchored Resistance  - 1 x daily - 7 x weekly - 2 sets - 10 reps  - Seated Shoulder Extension and Scapular Retraction with Resistance  - 1 x daily - 7 x weekly - 2 sets - 10 reps  - Supine Alternating Shoulder Flexion  - 1 x daily - 7 x weekly - 2 sets - 10 reps     Access Code: [email or web address]  URL: [email or web address]  Date: 01/28/2025  Prepared by: Nell Black     Exercises  - Shoulder Flexion Wall Slide with Towel  - 1 x daily - 7 x weekly - 2 sets - 10 reps  - Seated Scapular Retraction  - 1 x daily - 7 x weekly - 2 sets - 10 reps  - Standing Backward Shoulder Rolls  - 1 x daily - 7 x weekly - 2 sets - 10 reps  - Shoulder External Rotation and Scapular Retraction with Resistance  - 1 x daily - 7 x weekly - 2 sets - 10 reps  - Supine Shoulder Flexion with Dowel  - 1 x daily - 7 x weekly - 2 sets - 10 reps  - Supine Shoulder Abduction AAROM with Dowel  - 1 x daily - 7 x weekly - 2 sets - 10 reps           Assessment:  Fair tolerance noted with session.  Soreness noted with abduction motions.  Trial AROM supine flexion with end tightness noted.  Trial rows and extension with light resistance, no c/o increased sx.'s.  Handout provided and reviewed with patient.       Plan:  Continue with strengthening, ROM and flexibility per tolerance to improve daily activities/work duties with little to no difficulty.  MB       OP PT Plan  Treatment/Interventions: Cryotherapy, Education/ Instruction, Hot pack, Manual therapy, Therapeutic activities, Therapeutic exercises (Manual Therapy including IASTM as needed.)  PT Plan: Skilled PT  PT Frequency:  (2x/wk for 4 weeks or 9 sessions.  Need auth after 8 sessions)  Rehab Potential: Good  Plan of Care Agreement: Patient (Patient Goal:  Improve pain and use of R UE)              Goals:  Active       PT Problem       PT Goal 1       Start:  01/15/25    Expected End:   25       LTG's:  1) Improve  L shoulder strength  to >= 4+/5 throughout in order to facilitate ability to reach overhead and lift objects.      4-6 weeks      2) Improve  L shoulder PROM  to >= 160 deg flex/abd and 80 deg IR in order to better reach overhead or behind back.       4-6 weeks      3) Improve  L shoulder pain from   8/10 to <= 0-3/10 with activity in order to improve QOL.        4-6 weeks      4) Improve quick dash score by >= 5 points in order to improve QOL.    4-6 weeks      5) The pt will improve ability to raise arm/shoulder overhead, reach at various angles, lift/carry objects, dress upper body, overhead activity and return to exercise, and work around the home without significant limitation.    4-6 weeks        ST) Pt/caregiver will be I and consistent with HEP with use of handout as needed in order to maximize shoulder strength and ROM/flexibility.       2-3 weeks

## 2025-02-04 LAB
ANION GAP SERPL CALCULATED.4IONS-SCNC: 7 MMOL/L (CALC) (ref 7–17)
BUN SERPL-MCNC: 20 MG/DL (ref 7–25)
BUN/CREAT SERPL: NORMAL (CALC) (ref 6–22)
CALCIUM SERPL-MCNC: 9.1 MG/DL (ref 8.6–10.4)
CHLORIDE SERPL-SCNC: 106 MMOL/L (ref 98–110)
CHOLEST SERPL-MCNC: 193 MG/DL
CHOLEST/HDLC SERPL: 2.7 (CALC)
CO2 SERPL-SCNC: 30 MMOL/L (ref 20–32)
CREAT SERPL-MCNC: 0.79 MG/DL (ref 0.6–1)
EGFRCR SERPLBLD CKD-EPI 2021: 78 ML/MIN/1.73M2
GLUCOSE SERPL-MCNC: 90 MG/DL (ref 65–99)
HDLC SERPL-MCNC: 71 MG/DL
LDLC SERPL CALC-MCNC: 103 MG/DL (CALC)
NONHDLC SERPL-MCNC: 122 MG/DL (CALC)
POTASSIUM SERPL-SCNC: 4.5 MMOL/L (ref 3.5–5.3)
SODIUM SERPL-SCNC: 143 MMOL/L (ref 135–146)
TRIGL SERPL-MCNC: 95 MG/DL

## 2025-02-05 ENCOUNTER — TREATMENT (OUTPATIENT)
Dept: PHYSICAL THERAPY | Facility: CLINIC | Age: 75
End: 2025-02-05
Payer: COMMERCIAL

## 2025-02-05 DIAGNOSIS — M75.22 BICIPITAL TENDINITIS, LEFT SHOULDER: ICD-10-CM

## 2025-02-05 DIAGNOSIS — M75.52 BURSITIS OF LEFT SHOULDER: ICD-10-CM

## 2025-02-05 PROCEDURE — 97110 THERAPEUTIC EXERCISES: CPT | Mod: GP,CQ

## 2025-02-05 PROCEDURE — 97140 MANUAL THERAPY 1/> REGIONS: CPT | Mod: GP,CQ

## 2025-02-05 ASSESSMENT — PAIN - FUNCTIONAL ASSESSMENT: PAIN_FUNCTIONAL_ASSESSMENT: 0-10

## 2025-02-05 ASSESSMENT — PAIN SCALES - GENERAL: PAINLEVEL_OUTOF10: 3

## 2025-02-05 NOTE — PROGRESS NOTES
Physical Therapy Treatment    Patient Name: Jaida Malcolm  MRN: [id number]  Today's Date: 2/5/2025  Time Calculation  Start Time: 0914  Stop Time: 1000  Time Calculation (min): 46 min  PT Therapeutic Procedures Time Entry  Manual Therapy Time Entry: 10  Therapeutic Exercise Time Entry: 34       Assessment:   Patient identified by name and date of birth. Patient was able to progress with several exercises this date for increased strength and ROM. She required max cues to relax with PROM and presented with empty end feel with flexion and ER.     OBJECTIVE     Plan:  OP PT Plan  Treatment/Interventions: Cryotherapy, Education/ Instruction, Hot pack, Manual therapy, Therapeutic activities, Therapeutic exercises (Manual Therapy including IASTM as needed.)  PT Plan: Skilled PT  PT Frequency:  (2x/wk for 4 weeks or 9 sessions.  Need auth after 8 sessions)  Rehab Potential: Good  Plan of Care Agreement: Patient (Patient Goal:  Improve pain and use of R UE)  Continue with shoulder ROM and strengthening for increased ease with donning and doffing her coat.   Current Problem  Problem List Items Addressed This Visit             ICD-10-CM    Bicipital tendinitis, left shoulder M75.22    Bursitis of left shoulder M75.52       Subjective Patient reported compliance with % of the time and verbalized understanding.  Patient reported 0/10 pain at rest 8/10 with movement after treatment.   General  Reason for Referral: L shoulder issue  Referred By: Elly Simpson CNP  General Comment: Visit # 4/9  Needs auth after 8 sessions  Precautions  Precautions  Precautions Comment: Low Fall Risk.  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    Pain  Pain Assessment: 0-10  0-10 (Numeric) Pain Score: 3  Pain Type: Chronic pain  Pain Location: Shoulder  Pain Orientation: Left     Treatments:  Therapeutic  "Exercise  Therapeutic Exercise Performed: Yes  UBE lv x2 mins Fwds/Bwds    Pulley 3 min (X)  Resistive Rowing 2 x 10 reps purple tube (P)  Resistive Shoulder Extension uni L orange band 2 x 10 reps (P)  Resistive ER 2 x 10 reps L Orange band (N)  Resistive IR 2 x 10 reps L Orange band (N)  Wall slides flexion 2 x10 (P)  IR strap stretch x10 10\" hold (N)  ER stretch at wall 2 x 20\" (N)  Ball on wall (A)  S/L Shoulder ER 1# 2x10   Supine AAROM: Flex/Abd x10 ea   Supine AROM flexion 2 x 10 (P)  Supine ABC trace 1 cycle (N)    Not this date;  Posterior shoulder rolls x10 HEP  Scapular retractions x10 HEP     Manual;  Shoulder PROM all planes    STW to L UT and upper UE (N)     OP EDUCATION:  PT edu pt regarding PT POC/course of treatment and HEP.  Pt was given a pulley set to take home.  Pt verbalized good understanding.       Access Code: [email or web address]  URL: [email or web address]  Date: 01/30/2025  Prepared by: Nell Black     Exercises  - Seated Single Arm Shoulder Row with Anchored Resistance  - 1 x daily - 7 x weekly - 2 sets - 10 reps  - Seated Shoulder Extension and Scapular Retraction with Resistance  - 1 x daily - 7 x weekly - 2 sets - 10 reps  - Supine Alternating Shoulder Flexion  - 1 x daily - 7 x weekly - 2 sets - 10 reps     Access Code: [email or web address]  URL: [email or web address]  Date: 01/28/2025  Prepared by: Nell Black     Exercises  - Shoulder Flexion Wall Slide with Towel  - 1 x daily - 7 x weekly - 2 sets - 10 reps  - Seated Scapular Retraction  - 1 x daily - 7 x weekly - 2 sets - 10 reps  - Standing Backward Shoulder Rolls  - 1 x daily - 7 x weekly - 2 sets - 10 reps  - Shoulder External Rotation and Scapular Retraction with Resistance  - 1 x daily - 7 x weekly - 2 sets - 10 reps  - Supine Shoulder Flexion with Dowel  - 1 x daily - 7 x weekly - 2 sets - 10 reps  - Supine Shoulder Abduction AAROM with Dowel  - 1 x daily - 7 x weekly - 2 sets - 10 reps          "     Goals:  Active       PT Problem       PT Goal 1       Start:  01/15/25    Expected End:  25       LTG's:  1) Improve  L shoulder strength  to >= 4+/5 throughout in order to facilitate ability to reach overhead and lift objects.      4-6 weeks      2) Improve  L shoulder PROM  to >= 160 deg flex/abd and 80 deg IR in order to better reach overhead or behind back.       4-6 weeks      3) Improve  L shoulder pain from   8/10 to <= 0-3/10 with activity in order to improve QOL.        4-6 weeks      4) Improve quick dash score by >= 5 points in order to improve QOL.    4-6 weeks      5) The pt will improve ability to raise arm/shoulder overhead, reach at various angles, lift/carry objects, dress upper body, overhead activity and return to exercise, and work around the home without significant limitation.    4-6 weeks        ST) Pt/caregiver will be I and consistent with HEP with use of handout as needed in order to maximize shoulder strength and ROM/flexibility.       2-3 weeks

## 2025-02-06 ENCOUNTER — APPOINTMENT (OUTPATIENT)
Age: 75
End: 2025-02-06
Payer: COMMERCIAL

## 2025-02-06 VITALS
HEART RATE: 71 BPM | OXYGEN SATURATION: 100 % | DIASTOLIC BLOOD PRESSURE: 70 MMHG | BODY MASS INDEX: 19.64 KG/M2 | WEIGHT: 106.7 LBS | HEIGHT: 62 IN | SYSTOLIC BLOOD PRESSURE: 126 MMHG

## 2025-02-06 DIAGNOSIS — Z12.31 ENCOUNTER FOR SCREENING MAMMOGRAM FOR MALIGNANT NEOPLASM OF BREAST: ICD-10-CM

## 2025-02-06 DIAGNOSIS — Z78.0 MENOPAUSE: ICD-10-CM

## 2025-02-06 DIAGNOSIS — K21.9 CHRONIC GERD: ICD-10-CM

## 2025-02-06 DIAGNOSIS — E78.2 MIXED HYPERLIPIDEMIA: Primary | ICD-10-CM

## 2025-02-06 PROCEDURE — 3008F BODY MASS INDEX DOCD: CPT | Performed by: INTERNAL MEDICINE

## 2025-02-06 PROCEDURE — 99214 OFFICE O/P EST MOD 30 MIN: CPT | Performed by: INTERNAL MEDICINE

## 2025-02-06 PROCEDURE — 1160F RVW MEDS BY RX/DR IN RCRD: CPT | Performed by: INTERNAL MEDICINE

## 2025-02-06 PROCEDURE — 1036F TOBACCO NON-USER: CPT | Performed by: INTERNAL MEDICINE

## 2025-02-06 PROCEDURE — 1159F MED LIST DOCD IN RCRD: CPT | Performed by: INTERNAL MEDICINE

## 2025-02-06 RX ORDER — PRAVASTATIN SODIUM 20 MG/1
20 TABLET ORAL DAILY
Qty: 30 TABLET | Refills: 5 | Status: SHIPPED | OUTPATIENT
Start: 2025-02-06

## 2025-02-06 RX ORDER — FAMOTIDINE 20 MG/1
20 TABLET, FILM COATED ORAL 2 TIMES DAILY
Qty: 60 TABLET | Refills: 5 | Status: SHIPPED | OUTPATIENT
Start: 2025-02-06

## 2025-02-06 ASSESSMENT — ENCOUNTER SYMPTOMS
DIARRHEA: 0
ARTHRALGIAS: 0
FATIGUE: 0
VOMITING: 0
NAUSEA: 0
COUGH: 0
SHORTNESS OF BREATH: 0
ABDOMINAL PAIN: 0
PALPITATIONS: 0
WHEEZING: 0
BACK PAIN: 0
BLOOD IN STOOL: 0

## 2025-02-06 NOTE — PATIENT INSTRUCTIONS
Patient instructions  As we discussed I sent you several handouts via [employer] regarding calcium and vitamin D intake with supplementation guidelines as well as healthy diet.  I have ordered a DEXA scan and mammogram to be done 6 months from now.  Please remember to make sure your follow-up appointment occurs 2 weeks after your DEXA scan so that we have the results back in time and we can discuss them in detail  The staff will also be helping you to schedule your screening mammogram  I did not order lab work for your next checkup since the lab work was good at this time and Medicare only allows us to check cholesterol once a year

## 2025-02-06 NOTE — ASSESSMENT & PLAN NOTE
-We discussed vitamin supplementation including calcium and vitamin D.  I sent her several handouts today via [employer]  -She will get a bone density just prior to her next follow-up visit

## 2025-02-06 NOTE — PROGRESS NOTES
Subjective   Patient ID: Jaida Malcolm is a 74 y.o. female who presents for Follow-up (6 MO FU ).  HPI  She is here today for her routine 6-month checkup.  Her blood pressure is excellent.  She states that recently she developed a flulike illness but thankfully is doing better now.  During that time she states she was not eating well so her weight went down slightly.  She states she is back to eating regularly.  She asked about vitamins And diet.  I am sending her several handouts via [employer] with recommendations on calcium and vitamin D daily intake.  I have asked that she look at her daily diet and supplement if necessary.  I have also sent her to handouts on heart healthy diet and recommendations for healthy daily diet.  We also conducted a full review of systems.  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  She is breathing more frequently through her nostrils.  She is now faced with problems involving her right foot and is under the care of a podiatrist.  She will be having an MRI soon to assess her condition.  She is also undergoing physical therapy for left shoulder tendinitis.  We discussed mammography and she will be due this coming summer.  She also would be due for her bone density and we will schedule that as well.  We provided refills today and if everything goes according to plan we will see her back in 6 months for another checkup.  We also went over her recent laboratory test results and overall I am pleased with her numbers.  Review of Systems   Constitutional:  Negative for fatigue.   Respiratory:  Negative for cough, shortness of breath and wheezing.    Cardiovascular:  Negative for chest pain, palpitations and leg swelling.   Gastrointestinal:  Negative for abdominal pain, blood in stool, diarrhea, nausea and vomiting.   Musculoskeletal:  Negative for arthralgias and back pain.     Objective   Physical Exam  Vitals and nursing  note reviewed.   Constitutional:       General: She is not in acute distress.     Appearance: Normal appearance.   HENT:      Head: Normocephalic and atraumatic.   Eyes:      Conjunctiva/sclera: Conjunctivae normal.   Cardiovascular:      Rate and Rhythm: Normal rate and regular rhythm.      Heart sounds: Normal heart sounds.   Pulmonary:      Effort: No respiratory distress.      Breath sounds: No wheezing.   Abdominal:      Palpations: Abdomen is soft.      Tenderness: There is no abdominal tenderness. There is no guarding.   Musculoskeletal:         General: No swelling. Normal range of motion.   Skin:     General: Skin is warm and dry.   Neurological:      General: No focal deficit present.      Mental Status: She is alert and oriented to person, place, and time.   Psychiatric:         Behavior: Behavior normal.       Recent Results (from the past 5 weeks)   Lipid Panel    Collection Time: 02/03/25  7:49 AM   Result Value Ref Range    CHOLESTEROL, TOTAL 193 <200 mg/dL    HDL CHOLESTEROL 71 > OR = 50 mg/dL    TRIGLYCERIDES 95 <150 mg/dL    LDL-CHOLESTEROL 103 (H) mg/dL (calc)    CHOL/HDLC RATIO 2.7 <5.0 (calc)    NON HDL CHOLESTEROL 122 <130 mg/dL (calc)   Basic Metabolic Panel    Collection Time: 02/03/25  7:49 AM   Result Value Ref Range    GLUCOSE 90 65 - 99 mg/dL    UREA NITROGEN (BUN) 20 7 - 25 mg/dL    CREATININE 0.79 0.60 - 1.00 mg/dL    EGFR 78 > OR = 60 mL/min/1.73m2    BUN/CREATININE RATIO SEE NOTE: 6 - 22 (calc)    SODIUM 143 135 - 146 mmol/L    POTASSIUM 4.5 3.5 - 5.3 mmol/L    CHLORIDE 106 98 - 110 mmol/L    CARBON DIOXIDE 30 20 - 32 mmol/L    ELECTROLYTE BALANCE 7 7 - 17 mmol/L (calc)    CALCIUM 9.1 8.6 - 10.4 mg/dL       Assessment/Plan   Problem List Items Addressed This Visit             ICD-10-CM    Mixed hyperlipidemia - Primary E78.2     -Her cholesterol profile is excellent and we will check this again in 1 year per Medicare protocol         Relevant Medications    pravastatin (Pravachol) 20  mg tablet    Chronic GERD K21.9     -Her condition remains stable and she continues to take famotidine         Relevant Medications    famotidine (Pepcid) 20 mg tablet    Encounter for screening mammogram for malignant neoplasm of breast Z12.31    Relevant Orders    BI mammo bilateral screening tomosynthesis    Menopause Z78.0     -We discussed vitamin supplementation including calcium and vitamin D.  I sent her several handouts today via [employer]  -She will get a bone density just prior to her next follow-up visit         Relevant Orders    XR DEXA bone density   Patient instructions  As we discussed I sent you several handouts via [employer] regarding calcium and vitamin D intake with supplementation guidelines as well as healthy diet.  I have ordered a DEXA scan and mammogram to be done 6 months from now.  Please remember to make sure your follow-up appointment occurs 2 weeks after your DEXA scan so that we have the results back in time and we can discuss them in detail  The staff will also be helping you to schedule your screening mammogram  I did not order lab work for your next checkup since the lab work was good at this time and Medicare only allows us to check cholesterol once a year       Lynette Meeks, DO

## 2025-02-06 NOTE — ASSESSMENT & PLAN NOTE
-Her cholesterol profile is excellent and we will check this again in 1 year per Medicare protocol

## 2025-02-07 ENCOUNTER — TREATMENT (OUTPATIENT)
Dept: PHYSICAL THERAPY | Facility: CLINIC | Age: 75
End: 2025-02-07
Payer: COMMERCIAL

## 2025-02-07 DIAGNOSIS — M75.22 BICIPITAL TENDINITIS, LEFT SHOULDER: ICD-10-CM

## 2025-02-07 DIAGNOSIS — M75.52 BURSITIS OF LEFT SHOULDER: ICD-10-CM

## 2025-02-07 PROCEDURE — 97110 THERAPEUTIC EXERCISES: CPT | Mod: GP,CQ

## 2025-02-07 PROCEDURE — 97140 MANUAL THERAPY 1/> REGIONS: CPT | Mod: GP,CQ

## 2025-02-07 ASSESSMENT — PAIN SCALES - GENERAL: PAINLEVEL_OUTOF10: 4

## 2025-02-07 ASSESSMENT — PAIN - FUNCTIONAL ASSESSMENT: PAIN_FUNCTIONAL_ASSESSMENT: 0-10

## 2025-02-07 NOTE — PROGRESS NOTES
Physical Therapy Treatment    Patient Name: Jaida Malcolm  MRN: [id number]  Today's Date: 2/7/2025  Time Calculation  Start Time: 0908  Stop Time: 0955  Time Calculation (min): 47 min  PT Therapeutic Procedures Time Entry  Manual Therapy Time Entry: 11  Therapeutic Exercise Time Entry: 34       Assessment:   Patient identified by name and date of birth. Patient was able to progress with ROM and strengthening exercises with muscle fatigue noted. She required decreased cues to relax with PROM and presented with firm end feel with flexion.     OBJECTIVE   140 Degrees L shoulder flexion sitting AROM  Plan:  OP PT Plan  Treatment/Interventions: Cryotherapy, Education/ Instruction, Hot pack, Manual therapy, Therapeutic activities, Therapeutic exercises (Manual Therapy including IASTM as needed.)  PT Plan: Skilled PT  PT Frequency:  (2x/wk for 4 weeks or 9 sessions.  Need auth after 8 sessions)  Rehab Potential: Good  Plan of Care Agreement: Patient (Patient Goal:  Improve pain and use of R UE)  Continue with progression of ROM and strengthening for increased ease with overhead reaching.   Current Problem  Problem List Items Addressed This Visit             ICD-10-CM    Bicipital tendinitis, left shoulder M75.22    Bursitis of left shoulder M75.52       Subjective Patient reported compliance with % of the time and verbalized understanding.  She reported muscle soreness after her last treatment without pain. She reported 0/10 pain after treatment without movement,  General  Reason for Referral: L shoulder issue  Referred By: Elly Simpson CNP  General Comment: Visit # 5/9  Needs auth after 8 sessions  Precautions  Precautions  Precautions Comment: Low Fall Risk.  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    Pain  Pain Assessment: 0-10  0-10 (Numeric) Pain Score: 4  Pain Type: Chronic  "pain  Pain Location: Shoulder  Pain Orientation: Left     Treatments:  Therapeutic Exercise  Therapeutic Exercise Performed: Yes  UBE lv x3 mins Fwds/Bwds    Pulley 3 min (X)  Resistive Rowing 2 x 10 reps pink tube (P)  Resistive Shoulder Extension uni L orange band 2 x 10 reps   Resistive ER 2 x 10 reps L Orange band   Resistive IR 2 x 10 reps L Orange band   Wall slides flexion 2 x10 3\"Hold   IR strap stretch x10 10\" hold   ER stretch at wall 2 x 20\" (X)  Ball on wall (N)  -Flex/ext,lat, CW,CCW x10 playball  S/L Shoulder ER 1# 2x10   Supine AAROM: Flex/Abd x10 ea 3\" hold at end range   Supine AROM flexion 2 x 10   Supine ABC trace 1 cycle 1# (P)     Not this date;  Posterior shoulder rolls x10 HEP  Scapular retractions x10 HEP      Manual;  Shoulder PROM all planes    STW to L UT and upper UE (N)     OP EDUCATION:  Access Code: [email or web address]  URL: [email or web address]  Date: 02/07/2025  Prepared by: Ashleigh Dejesus    Exercises  - Standing Overhead Shoulder External Rotation Stretch with Towel  - 1 x daily - 7 x weekly - 1 sets - 10 reps - 10 hold  - Shoulder External Rotation with Anchored Resistance  - 1 x daily - 7 x weekly - 2 sets - 10 reps  - Shoulder Internal Rotation with Resistance  - 1 x daily - 7 x weekly - 2 sets - 10 reps  - Supine Shoulder Alphabet  - 1 x daily - 7 x weekly - 1 sets - 1 reps  PT edu pt regarding PT POC/course of treatment and HEP.  Pt was given a pulley set to take home.  Pt verbalized good understanding.       Access Code: [email or web address]  URL: [email or web address]  Date: 01/30/2025  Prepared by: Nell Joy     Exercises  - Seated Single Arm Shoulder Row with Anchored Resistance  - 1 x daily - 7 x weekly - 2 sets - 10 reps  - Seated Shoulder Extension and Scapular Retraction with Resistance  - 1 x daily - 7 x weekly - 2 sets - 10 reps  - Supine Alternating Shoulder Flexion  - 1 x daily - 7 x weekly - 2 sets - 10 reps     Access Code: " [email or web address]  URL: [email or web address]  Date: 2025  Prepared by: Nell Joy     Exercises  - Shoulder Flexion Wall Slide with Towel  - 1 x daily - 7 x weekly - 2 sets - 10 reps  - Seated Scapular Retraction  - 1 x daily - 7 x weekly - 2 sets - 10 reps  - Standing Backward Shoulder Rolls  - 1 x daily - 7 x weekly - 2 sets - 10 reps  - Shoulder External Rotation and Scapular Retraction with Resistance  - 1 x daily - 7 x weekly - 2 sets - 10 reps  - Supine Shoulder Flexion with Dowel  - 1 x daily - 7 x weekly - 2 sets - 10 reps  - Supine Shoulder Abduction AAROM with Dowel  - 1 x daily - 7 x weekly - 2 sets - 10 reps        Goals:  Active       PT Problem       PT Goal 1       Start:  01/15/25    Expected End:  25       LTG's:  1) Improve  L shoulder strength  to >= 4+/5 throughout in order to facilitate ability to reach overhead and lift objects.      4-6 weeks      2) Improve  L shoulder PROM  to >= 160 deg flex/abd and 80 deg IR in order to better reach overhead or behind back.       4-6 weeks      3) Improve  L shoulder pain from   8/10 to <= 0-3/10 with activity in order to improve QOL.        4-6 weeks      4) Improve quick dash score by >= 5 points in order to improve QOL.    4-6 weeks      5) The pt will improve ability to raise arm/shoulder overhead, reach at various angles, lift/carry objects, dress upper body, overhead activity and return to exercise, and work around the home without significant limitation.    4-6 weeks        ST) Pt/caregiver will be I and consistent with HEP with use of handout as needed in order to maximize shoulder strength and ROM/flexibility.       2-3 weeks

## 2025-02-12 ENCOUNTER — TREATMENT (OUTPATIENT)
Dept: PHYSICAL THERAPY | Facility: CLINIC | Age: 75
End: 2025-02-12
Payer: COMMERCIAL

## 2025-02-12 DIAGNOSIS — M75.22 BICIPITAL TENDINITIS, LEFT SHOULDER: ICD-10-CM

## 2025-02-12 DIAGNOSIS — M75.52 BURSITIS OF LEFT SHOULDER: ICD-10-CM

## 2025-02-12 PROCEDURE — 97140 MANUAL THERAPY 1/> REGIONS: CPT | Mod: GP | Performed by: PHYSICAL THERAPIST

## 2025-02-12 PROCEDURE — 97110 THERAPEUTIC EXERCISES: CPT | Mod: GP | Performed by: PHYSICAL THERAPIST

## 2025-02-12 ASSESSMENT — PAIN SCALES - GENERAL: PAINLEVEL_OUTOF10: 3

## 2025-02-12 ASSESSMENT — PAIN - FUNCTIONAL ASSESSMENT: PAIN_FUNCTIONAL_ASSESSMENT: 0-10

## 2025-02-12 NOTE — PROGRESS NOTES
Physical Therapy Treatment    Patient Name: Jaida Malcolm  MRN: [id number]  Today's Date: 2/12/2025  Time Calculation  Start Time: 0917  Stop Time: 0957  Time Calculation (min): 40 min      PT Therapeutic Procedures Time Entry  Manual Therapy Time Entry: 10  Therapeutic Exercise Time Entry: 29                         General  Reason for Referral: L shoulder issue  Referred By: Elly Simpson CNP  General Comment: Visit # 6/9  Needs auth after 8 sessions  Visit #6    Assessment:   Pt had good overall tolerance to exercises performed in treatment today.  Pt was challenged with ex's performed.  Good form with ex's completed with minimal cueing needed.  PROM 160 deg scaption today comfortably.        Plan:  OP PT Plan  Treatment/Interventions: Cryotherapy, Education/ Instruction, Hot pack, Manual therapy, Therapeutic activities, Therapeutic exercises (Manual Therapy including IASTM as needed.)  PT Plan: Skilled PT  PT Frequency:  (2x/wk for 4 weeks or 9 sessions.  Need auth after 8 sessions)  Rehab Potential: Good  Plan of Care Agreement: Patient (Patient Goal:  Improve pain and use of R UE)    Continue with progression of ROM and strengthening for increased ease with overhead reaching.     Current Problem  Problem List Items Addressed This Visit             ICD-10-CM    Bicipital tendinitis, left shoulder M75.22    Bursitis of left shoulder M75.52       Subjective  Pt reports that her L shoulder is doing better overall but that she still has pain with movement at various angles.      Precautions  Precautions  Precautions Comment: Low Fall Risk.  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    Pain  Pain Assessment: 0-10  0-10 (Numeric) Pain Score: 3  Pain Type: Chronic pain  Pain Location: Shoulder  Pain Orientation: Left      Treatments:  There Ex:  29 min   2 units  UBE lv x3 mins Fwds/Bwds   "  Pulley 3 min   Resistive Rowing    Pink tube   2 x 10 reps  Resistive Shoulder Extension   Pink tube   2 x 10 reps   Resistive ER   L Orange band    2 x 10 reps   Resistive IR    L Orange band    2 x 10 reps  Wall slides flexion  2 x10  3\"Hold   IR strap stretch  x10  10\" hold   Ball on wall   X  -Flex/ext,lat, CW,CCW x10 playball  S/L Shoulder ER 1# 2x10   X  Supine AAROM: Flex/Abd x10 ea 3\" hold at end range   X  Supine AROM flexion 2 x 10   X  Supine ABC trace 1 cycle 1#   X        Manual:   10 min   1 unit  Shoulder PROM all planes    STW to L UT and upper UE         OP EDUCATION:   Edu on proper form and speed of movement with ex's.  Pt verbalized/demonstrated good understanding.  Access Code: [email or web address]  URL: [email or web address]  Date: 02/07/2025  Prepared by: Ashleigh Dejesus     Exercises  - Standing Overhead Shoulder External Rotation Stretch with Towel  - 1 x daily - 7 x weekly - 1 sets - 10 reps - 10 hold  - Shoulder External Rotation with Anchored Resistance  - 1 x daily - 7 x weekly - 2 sets - 10 reps  - Shoulder Internal Rotation with Resistance  - 1 x daily - 7 x weekly - 2 sets - 10 reps  - Supine Shoulder Alphabet  - 1 x daily - 7 x weekly - 1 sets - 1 reps  PT edu pt regarding PT POC/course of treatment and HEP.  Pt was given a pulley set to take home.  Pt verbalized good understanding.       Access Code: [email or web address]  URL: [email or web address]  Date: 01/30/2025  Prepared by: Nell Black     Exercises  - Seated Single Arm Shoulder Row with Anchored Resistance  - 1 x daily - 7 x weekly - 2 sets - 10 reps  - Seated Shoulder Extension and Scapular Retraction with Resistance  - 1 x daily - 7 x weekly - 2 sets - 10 reps  - Supine Alternating Shoulder Flexion  - 1 x daily - 7 x weekly - 2 sets - 10 reps     Access Code: [email or web address]  URL: [email or web address]  Date: 01/28/2025  Prepared by: Nell Black     Exercises  - Shoulder Flexion " Wall Slide with Towel  - 1 x daily - 7 x weekly - 2 sets - 10 reps  - Seated Scapular Retraction  - 1 x daily - 7 x weekly - 2 sets - 10 reps  - Standing Backward Shoulder Rolls  - 1 x daily - 7 x weekly - 2 sets - 10 reps  - Shoulder External Rotation and Scapular Retraction with Resistance  - 1 x daily - 7 x weekly - 2 sets - 10 reps  - Supine Shoulder Flexion with Dowel  - 1 x daily - 7 x weekly - 2 sets - 10 reps  - Supine Shoulder Abduction AAROM with Dowel  - 1 x daily - 7 x weekly - 2 sets - 10 reps          Goals:  Active       PT Problem       PT Goal 1       Start:  01/15/25    Expected End:  25       LTG's:  1) Improve  L shoulder strength  to >= 4+/5 throughout in order to facilitate ability to reach overhead and lift objects.      4-6 weeks      2) Improve  L shoulder PROM  to >= 160 deg flex/abd and 80 deg IR in order to better reach overhead or behind back.       4-6 weeks      3) Improve  L shoulder pain from   8/10 to <= 0-3/10 with activity in order to improve QOL.        4-6 weeks      4) Improve quick dash score by >= 5 points in order to improve QOL.    4-6 weeks      5) The pt will improve ability to raise arm/shoulder overhead, reach at various angles, lift/carry objects, dress upper body, overhead activity and return to exercise, and work around the home without significant limitation.    4-6 weeks        ST) Pt/caregiver will be I and consistent with HEP with use of handout as needed in order to maximize shoulder strength and ROM/flexibility.       2-3 weeks

## 2025-02-14 ENCOUNTER — TREATMENT (OUTPATIENT)
Dept: PHYSICAL THERAPY | Facility: CLINIC | Age: 75
End: 2025-02-14
Payer: COMMERCIAL

## 2025-02-14 DIAGNOSIS — M75.22 BICIPITAL TENDINITIS, LEFT SHOULDER: ICD-10-CM

## 2025-02-14 DIAGNOSIS — M75.52 BURSITIS OF LEFT SHOULDER: ICD-10-CM

## 2025-02-14 PROCEDURE — 97140 MANUAL THERAPY 1/> REGIONS: CPT | Mod: GP,CQ

## 2025-02-14 PROCEDURE — 97110 THERAPEUTIC EXERCISES: CPT | Mod: GP,CQ

## 2025-02-14 ASSESSMENT — PAIN - FUNCTIONAL ASSESSMENT: PAIN_FUNCTIONAL_ASSESSMENT: 0-10

## 2025-02-14 ASSESSMENT — PAIN SCALES - GENERAL: PAINLEVEL_OUTOF10: 5 - MODERATE PAIN

## 2025-02-14 NOTE — PROGRESS NOTES
Physical Therapy Treatment    Patient Name: Jaida Malcolm  MRN: [id number]  Today's Date: 2/14/2025  Time Calculation  Start Time: 0915  Stop Time: 0955  Time Calculation (min): 40 min  PT Therapeutic Procedures Time Entry  Manual Therapy Time Entry: 10  Therapeutic Exercise Time Entry: 28       Assessment:   Patient identified by name and date of birth. Patient was able to progress with strengthening this date with mild discomfort/fatigue noted. She presented with  firm end feel with PROM.     OBJECTIVE     Plan:  OP PT Plan  Treatment/Interventions: Cryotherapy, Education/ Instruction, Hot pack, Manual therapy, Therapeutic activities, Therapeutic exercises (Manual Therapy including IASTM as needed.)  PT Plan: Skilled PT  PT Frequency:  (2x/wk for 4 weeks or 9 sessions.  Need auth after 8 sessions)  Rehab Potential: Good  Plan of Care Agreement: Patient (Patient Goal:  Improve pain and use of R UE)  Patient has re-check with PT next visit.   Current Problem  Problem List Items Addressed This Visit             ICD-10-CM    Bicipital tendinitis, left shoulder M75.22    Bursitis of left shoulder M75.52       Subjective Patient reported compliance with % of the time and verbalized understanding.  Patient reported she feels like she is getting better but continues to experience with Sx with some movements. No change in Sx after treatment.   General  Reason for Referral: L shoulder issue  Referred By: Elly Simpson CNP  General Comment: Visit # 7/9  Needs auth after 8 sessions  Precautions  Precautions  Precautions Comment: Low Fall Risk.  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    Pain  Pain Assessment: 0-10  0-10 (Numeric) Pain Score: 5 - Moderate pain  Pain Type: Chronic pain  Pain Location: Shoulder  Pain Orientation: Left     Treatments:  Therapeutic Exercise  Therapeutic  "Exercise Performed: Yes  UBE lv x3 mins Fwds/Bwds    Pulley 3 min   Resistive Rowing Pink tube 2 x 10 reps  Resistive Shoulder Extension Pink tube 2 x 10 reps   Resistive ER L Orange band 2 x 10 reps   Resistive IR L Green band 2 x 10 reps (P)  Wall slides flexion 2 x10  3\"Hold   Shoulder flexion 2 x 10 (N)  Shoulder abd 2 x 10 (N)  D1 & D2 flexion x10 ea (N)  Ball on wall     -Flex/ext,lat, CW,CCW x10 play ball  Supine ABC trace 1 cycle 1#   X  Not this date;   IR strap stretch  x10  10\" hold (X)  S/L Shoulder ER 1# 2x10   X  Supine AAROM: Flex/Abd x10 ea 3\" hold at end range   X  Supine AROM flexion 2 x 10   X      Manual:   10 min   1 unit  Shoulder PROM all planes    STW to L UT and upper UE       OP EDUCATION:   Edu on proper form and speed of movement with ex's.  Pt verbalized/demonstrated good understanding.  Access Code: [email or web address]  URL: [email or web address]  Date: 02/07/2025  Prepared by: Ashleigh Dejesus     Exercises  - Standing Overhead Shoulder External Rotation Stretch with Towel  - 1 x daily - 7 x weekly - 1 sets - 10 reps - 10 hold  - Shoulder External Rotation with Anchored Resistance  - 1 x daily - 7 x weekly - 2 sets - 10 reps  - Shoulder Internal Rotation with Resistance  - 1 x daily - 7 x weekly - 2 sets - 10 reps  - Supine Shoulder Alphabet  - 1 x daily - 7 x weekly - 1 sets - 1 reps  PT edu pt regarding PT POC/course of treatment and HEP.  Pt was given a pulley set to take home.  Pt verbalized good understanding.       Access Code: [email or web address]  URL: [email or web address]  Date: 01/30/2025  Prepared by: Nell Joy     Exercises  - Seated Single Arm Shoulder Row with Anchored Resistance  - 1 x daily - 7 x weekly - 2 sets - 10 reps  - Seated Shoulder Extension and Scapular Retraction with Resistance  - 1 x daily - 7 x weekly - 2 sets - 10 reps  - Supine Alternating Shoulder Flexion  - 1 x daily - 7 x weekly - 2 sets - 10 reps     Access Code: " [email or web address]  URL: [email or web address]  Date: 2025  Prepared by: Nell Joy     Exercises  - Shoulder Flexion Wall Slide with Towel  - 1 x daily - 7 x weekly - 2 sets - 10 reps  - Seated Scapular Retraction  - 1 x daily - 7 x weekly - 2 sets - 10 reps  - Standing Backward Shoulder Rolls  - 1 x daily - 7 x weekly - 2 sets - 10 reps  - Shoulder External Rotation and Scapular Retraction with Resistance  - 1 x daily - 7 x weekly - 2 sets - 10 reps  - Supine Shoulder Flexion with Dowel  - 1 x daily - 7 x weekly - 2 sets - 10 reps  - Supine Shoulder Abduction AAROM with Dowel  - 1 x daily - 7 x weekly - 2 sets - 10 reps        Goals:  Active       PT Problem       PT Goal 1       Start:  01/15/25    Expected End:  25       LTG's:  1) Improve  L shoulder strength  to >= 4+/5 throughout in order to facilitate ability to reach overhead and lift objects.      4-6 weeks      2) Improve  L shoulder PROM  to >= 160 deg flex/abd and 80 deg IR in order to better reach overhead or behind back.       4-6 weeks      3) Improve  L shoulder pain from   8/10 to <= 0-3/10 with activity in order to improve QOL.        4-6 weeks      4) Improve quick dash score by >= 5 points in order to improve QOL.    4-6 weeks      5) The pt will improve ability to raise arm/shoulder overhead, reach at various angles, lift/carry objects, dress upper body, overhead activity and return to exercise, and work around the home without significant limitation.    4-6 weeks        ST) Pt/caregiver will be I and consistent with HEP with use of handout as needed in order to maximize shoulder strength and ROM/flexibility.       2-3 weeks

## 2025-02-19 ENCOUNTER — TREATMENT (OUTPATIENT)
Dept: PHYSICAL THERAPY | Facility: CLINIC | Age: 75
End: 2025-02-19
Payer: COMMERCIAL

## 2025-02-19 DIAGNOSIS — M75.22 BICIPITAL TENDINITIS, LEFT SHOULDER: ICD-10-CM

## 2025-02-19 DIAGNOSIS — M75.52 BURSITIS OF LEFT SHOULDER: Primary | ICD-10-CM

## 2025-02-19 PROCEDURE — 97110 THERAPEUTIC EXERCISES: CPT | Mod: GP | Performed by: PHYSICAL THERAPIST

## 2025-02-19 ASSESSMENT — PAIN SCALES - GENERAL: PAINLEVEL_OUTOF10: 2

## 2025-02-19 ASSESSMENT — PAIN - FUNCTIONAL ASSESSMENT: PAIN_FUNCTIONAL_ASSESSMENT: 0-10

## 2025-02-19 NOTE — PROGRESS NOTES
Physical Therapy Discharge/Treatment    Patient Name: Jaida Malcolm  MRN: [id number]  Today's Date: 2/19/2025  Time Calculation  Start Time: 0832  Stop Time: 0912  Time Calculation (min): 40 min      PT Therapeutic Procedures Time Entry  Therapeutic Exercise Time Entry: 39                         General  Reason for Referral: L shoulder issue  Referred By: Elly Simpson CNP  General Comment: Visit # 8  Needs auth after 8 sessions  Visit #8    Assessment:   Pt had good overall tolerance to exercises performed in treatment today.  Pt was challenged with ex's performed.  Good form with ex's completed with minimal cueing needed.      2/19/2025 PT DC Summary:  The pt has made good objective progress in PT with improved L shoulder strength and ROM/flexibility.  The pt has MET or PARTIALLY MET most of her PT goals and is I with current HEP.  DC PT at this time.  Follow up with MD as needed.        Plan:  OP PT Plan  Treatment/Interventions: Cryotherapy, Education/ Instruction, Hot pack, Manual therapy, Therapeutic activities, Therapeutic exercises (Manual Therapy including IASTM as needed.)  PT Plan: Skilled PT  PT Frequency:  (No Further Sessions.  DC.)  Rehab Potential: Good  Plan of Care Agreement: Patient (Patient Goal:  Improve pain and use of R UE)    Current Problem  Problem List Items Addressed This Visit             ICD-10-CM    Bicipital tendinitis, left shoulder M75.22    Bursitis of left shoulder - Primary M75.52       Subjective  Pt reports that her L shoulder is doing a lot better overall but that she was shoveling in her driveway a few days ago and hit a rough area that liberty her shoulder and was having some increased pain for a day or two.  Pt reports that he feels better today and that she feels good about DC today and will continue with her HEP I moving forward.      Precautions  Precautions  Precautions Comment: Low Fall Risk.  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    Pain  Pain Assessment: 0-10  0-10 (Numeric) Pain Score: 2  Pain Type: Chronic pain  Pain Location: Shoulder  Pain Orientation: Left      Treatments:  There Ex:  39 min   3 units  UBE lv x3 mins Fwds/Bwds    Pulley 3 min   Resistive Rowing Pink tube 2 x 10 reps  Resistive Shoulder Extension Pink tube 2 x 10 reps   Resistive ER L Orange band 2 x 10 reps   Resistive IR L Green band 2 x 10 reps   Wall slides flexion 2 x10  3\"Hold   Shoulder flexion 2 x 10   Shoulder scaption 2 x 10   2/18/2025 PT DC Summary  Completed Today.    Following Ex's X Today:  D1 & D2 flexion x10 ea   Ball on wall     -Flex/ext,lat, CW,CCW x10 play ball  Supine ABC trace 1 cycle 1#   X  Not this date;   IR strap stretch  x10  10\" hold (X)  S/L Shoulder ER 1# 2x10   X  Supine AAROM: Flex/Abd x10 ea 3\" hold at end range   X  Supine AROM flexion 2 x 10   X        Manual:  X Today  Shoulder PROM all planes    STW to L UT and upper UE         OP EDUCATION:   Edu on proper form and speed of movement with ex's.  Pt verbalized/demonstrated good understanding.  Access Code: [email or web address]  URL: [email or web address]  Date: 02/07/2025  Prepared by: Ashleigh Dejesus     Exercises  - Standing Overhead Shoulder External Rotation Stretch with Towel  - 1 x daily - 7 x weekly - 1 sets - 10 reps - 10 hold  - Shoulder External Rotation with Anchored Resistance  - 1 x daily - 7 x weekly - 2 sets - 10 reps  - Shoulder Internal Rotation with Resistance  - 1 x daily - 7 x weekly - 2 sets - 10 reps  - Supine Shoulder Alphabet  - 1 x daily - 7 x weekly - 1 sets - 1 reps  PT edu pt regarding PT POC/course of treatment and HEP.  Pt was given a pulley set to take home.  Pt verbalized good understanding.       Access Code: [email or web address]  URL: [email or web address]  Date: 01/30/2025  Prepared by: Nell Joy     Exercises  - Seated Single " Arm Shoulder Row with Anchored Resistance  - 1 x daily - 7 x weekly - 2 sets - 10 reps  - Seated Shoulder Extension and Scapular Retraction with Resistance  - 1 x daily - 7 x weekly - 2 sets - 10 reps  - Supine Alternating Shoulder Flexion  - 1 x daily - 7 x weekly - 2 sets - 10 reps     Access Code: [email or web address]  URL: [email or web address]  Date: 01/28/2025  Prepared by: Nell Black     Exercises  - Shoulder Flexion Wall Slide with Towel  - 1 x daily - 7 x weekly - 2 sets - 10 reps  - Seated Scapular Retraction  - 1 x daily - 7 x weekly - 2 sets - 10 reps  - Standing Backward Shoulder Rolls  - 1 x daily - 7 x weekly - 2 sets - 10 reps  - Shoulder External Rotation and Scapular Retraction with Resistance  - 1 x daily - 7 x weekly - 2 sets - 10 reps  - Supine Shoulder Flexion with Dowel  - 1 x daily - 7 x weekly - 2 sets - 10 reps  - Supine Shoulder Abduction AAROM with Dowel  - 1 x daily - 7 x weekly - 2 sets - 10 reps             Goals:  Active       PT Problem       PT Goal 1       Start:  01/15/25    Expected End:  03/17/25       LTG's:  1) Improve  L shoulder strength  to >= 4+/5 throughout in order to facilitate ability to reach overhead and lift objects.      4-6 weeks  2/19/2025, PARTIALLY MET, L shoulder strength 4+/5 flex/abd in slight scaption, 4+/5 IR and 4- to 4/5 ER at DC    2) Improve  L shoulder PROM  to >= 160 deg flex/abd and 80 deg IR in order to better reach overhead or behind back.       4-6 weeks  2/19/2025, MET, L shoulder PROM  160 deg flex/abd in slight scaption and 80 deg ER/IR.    3) Improve  L shoulder pain from   8/10 to <= 0-3/10 with activity in order to improve QOL.        4-6 weeks  2/19/2025, PARTIALLY MET, L shoulder pain range 2-5/10 with activity    4) Improve quick dash score by >= 5 points in order to improve QOL.    4-6 weeks  2/19/2025, NOT MET, pt scored a 27.27 at baseline and a 34.09 at DC.    5) The pt will improve ability to raise arm/shoulder  overhead, reach at various angles, lift/carry objects, dress upper body, overhead activity and return to exercise, and work around the home without significant limitation.    4-6 weeks  2025, PARTIALLY MET, pt is doing better with all above activity but has to be cautious with lifting overhead, reaching backwards and straight out to the side.      ST) Pt/caregiver will be I and consistent with HEP with use of handout as needed in order to maximize shoulder strength and ROM/flexibility.       2-3 weeks  2025, MET, pt is I with HEP and has handouts as a reference.

## 2025-03-05 ENCOUNTER — APPOINTMENT (OUTPATIENT)
Dept: PHYSICAL THERAPY | Facility: CLINIC | Age: 75
End: 2025-03-05
Payer: COMMERCIAL

## 2025-03-07 ENCOUNTER — APPOINTMENT (OUTPATIENT)
Dept: PHYSICAL THERAPY | Facility: CLINIC | Age: 75
End: 2025-03-07
Payer: COMMERCIAL

## 2025-03-11 ENCOUNTER — APPOINTMENT (OUTPATIENT)
Dept: PHYSICAL THERAPY | Facility: CLINIC | Age: 75
End: 2025-03-11
Payer: COMMERCIAL

## 2025-03-13 ENCOUNTER — APPOINTMENT (OUTPATIENT)
Dept: PHYSICAL THERAPY | Facility: CLINIC | Age: 75
End: 2025-03-13
Payer: COMMERCIAL

## 2025-06-02 ENCOUNTER — OFFICE VISIT (OUTPATIENT)
Age: 75
End: 2025-06-02
Payer: COMMERCIAL

## 2025-06-02 DIAGNOSIS — R35.0 FREQUENCY OF URINATION: Primary | ICD-10-CM

## 2025-06-02 LAB
POC APPEARANCE, URINE: CLEAR
POC BILIRUBIN, URINE: NEGATIVE
POC BLOOD, URINE: NEGATIVE
POC COLOR, URINE: YELLOW
POC GLUCOSE, URINE: NEGATIVE MG/DL
POC KETONES, URINE: NEGATIVE MG/DL
POC LEUKOCYTES, URINE: NEGATIVE
POC NITRITE,URINE: NEGATIVE
POC PH, URINE: 7 PH
POC PROTEIN, URINE: NEGATIVE MG/DL
POC SPECIFIC GRAVITY, URINE: 1.01
POC UROBILINOGEN, URINE: 0.2 EU/DL

## 2025-06-02 PROCEDURE — 81003 URINALYSIS AUTO W/O SCOPE: CPT | Performed by: INTERNAL MEDICINE

## 2025-06-04 NOTE — RESULT ENCOUNTER NOTE
Please call Jaida and let her know that her urine test came back entirely normal.  (I am so sorry but I do not remember this interaction with her.  Did she call in or show up with the desk with complaints of urinary symptoms?)  Thank you

## 2025-06-09 ENCOUNTER — OFFICE VISIT (OUTPATIENT)
Dept: URGENT CARE | Facility: CLINIC | Age: 75
End: 2025-06-09
Payer: COMMERCIAL

## 2025-06-09 VITALS
RESPIRATION RATE: 16 BRPM | HEART RATE: 68 BPM | SYSTOLIC BLOOD PRESSURE: 128 MMHG | DIASTOLIC BLOOD PRESSURE: 73 MMHG | OXYGEN SATURATION: 98 % | TEMPERATURE: 97.9 F

## 2025-06-09 DIAGNOSIS — B96.89 ACUTE BACTERIAL SINUSITIS: Primary | ICD-10-CM

## 2025-06-09 DIAGNOSIS — J01.90 ACUTE BACTERIAL SINUSITIS: Primary | ICD-10-CM

## 2025-06-09 PROCEDURE — 99213 OFFICE O/P EST LOW 20 MIN: CPT | Performed by: NURSE PRACTITIONER

## 2025-06-09 RX ORDER — AMOXICILLIN AND CLAVULANATE POTASSIUM 875; 125 MG/1; MG/1
875 TABLET, FILM COATED ORAL 2 TIMES DAILY
Qty: 14 TABLET | Refills: 0 | Status: SHIPPED | OUTPATIENT
Start: 2025-06-09 | End: 2025-06-16

## 2025-06-09 NOTE — PROGRESS NOTES
[facility] URGENT CARE FREEDOM NOTE:      Name: Jaida Malcolm, 74 y.o.    CSN:[id number]   MRN:[id number]    PCP: Lynette Meeks, DO    ALL:  Allergies[1]    History:    Chief Complaint: Sinus Problem (Sinus congestion/pressure x 3 days )    Encounter Date: 6/9/2025     HPI: The history was obtained from the patient. Jaida is a 74 y.o. female, who presents with a chief complaint of Sinus Problem (Sinus congestion/pressure x 3 days ). Patient presents with nasal congestion, dentofacial pain, and chills for the last 3 days. Denies any fever, myalgias, abdominal pain/tenderness, nausea, vomiting, diarrhea, shortness of breath/wheezing, or chest pain. She has a history of perennial allergies, takes daily loratadine and fluticasone intranasal spray without relief; no other over-the-counter medications or home remedies for symptom management. No known history of: asthma, COPD/emphysema, or tobacco use. No known sick contacts or recent travel. Denies any recent antibiotic use.    Extensive ENT history. Status post left maxillary antrostomy and partial left ethmoidectomy. She had a septoplasty in 09/2024 with Dr. Morocho. She had a dentist appointment last Friday for routine cleaning, denies any periodontal disease, caries, or gingivitis. Brushes teeth daily.    PMHx:    Medical History[2]         Current Medications[3]      PMSx:  Surgical History[4]    Fam Hx: Family History[5]    SOC. Hx:     Social History     Socioeconomic History    Marital status: Single     Spouse name: Not on file    Number of children: Not on file    Years of education: Not on file    Highest education level: Not on file   Occupational History    Not on file   Tobacco Use    Smoking status: Never    Smokeless tobacco: Never   Vaping Use    Vaping status: Never Used   Substance and Sexual Activity    Alcohol use: Not Currently    Drug use: Not Currently    Sexual activity: Not on file   Other Topics Concern    Not on file   Social  History Narrative    Not on file     Social Drivers of Health     Financial Resource Strain: Not on file   Food Insecurity: Not on file   Transportation Needs: Not on file   Physical Activity: Not on file   Stress: Not on file   Social Connections: Not on file   Intimate Partner Violence: Not on file   Housing Stability: Not on file         Vitals:    06/09/25 1642   BP: (!) 166/93   Pulse: 68   Resp: 16   Temp: 36.6 °C (97.9 °F)   SpO2: 98%                Physical Exam  Vitals reviewed.   Constitutional:       Appearance: Normal appearance.   HENT:      Head: Normocephalic and atraumatic.      Right Ear: Tympanic membrane, ear canal and external ear normal.      Left Ear: Tympanic membrane, ear canal and external ear normal.      Nose: Congestion and rhinorrhea present.      Mouth/Throat:      Mouth: Mucous membranes are moist.      Pharynx: Oropharynx is clear.        Comments: torus palatinus; No masses, lesions, or abscesses palpated; no bleeding, erythema, or exudate noted  Cardiovascular:      Rate and Rhythm: Normal rate and regular rhythm.      Pulses: Normal pulses.      Heart sounds: Normal heart sounds.   Pulmonary:      Effort: Pulmonary effort is normal.      Breath sounds: Normal breath sounds.   Musculoskeletal:      Cervical back: Neck supple.   Skin:     General: Skin is warm and dry.      Capillary Refill: Capillary refill takes less than 2 seconds.   Neurological:      Mental Status: She is alert. Mental status is at baseline.   Psychiatric:         Mood and Affect: Mood normal.         ____________________________________________________________________    I did personally review Jaida's past medical history, surgical history, social history, as well as family history (when relevant).  In this case, I also oversaw the her drug management by reviewing her medication list, allergy list, as well as the medications that I prescribed during the UC course and/or recommended as an out-patient  (including possible OTC medications such as acetaminophen, NSAIDs , etc).    After reviewing the items above, I did look at previous medical documentation, such as recent hospitalizations, office visits, and/or recent consultations with PCP/specialist.                          SDOH:   Another factor that I considered in Jaida's care was her Social Determinants of Health (SDOH). During this UC encounter, she did have social determinants of health. Those SDOH influencing Jaida's care are: access to medical care       COURSE/MEDICAL DECISION MAKING:    Jaida is a 74 y.o., who presents with a working diagnosis of No diagnosis found. with a differential to include: dental abscess, periodontal disease/caries, trigeminal neuralgia, influenza, parainfluenza, rhinovirus, adenovirus, metapneumovirus, coronavirus, COVID-19, postnasal drip, strep pharyngitis, GERD, retropharyngeal abscess, tonsillitis, adenitis, seasonal allergies    Plan:   Amoxicillin-clavulanate 875-125mg BID x 7 days  If symptoms do not improve, follow up with ENT for further evaluation/management  Return to urgent care, primary care provider, or emergency department with worsening symptoms.      I, Charlie Wharton, helped prepare the medical record for my supervising clinician, Therese Santo DNP.     Charlie Wharton RN, [facility]    Supervised by  Therese Santo DNP   Advanced Practice Provider  [facility] URGENT CARE    I was present with the APRN student who participated in the documentation of this note. I have personally seen and re-examined the patient and performed the medical decision-making components (assessment and plan of care). I have reviewed the APRN student documentation and verified the findings in the note as written with additions or exceptions as stated in the body of this note.           [1]   Allergies  Allergen Reactions    Clindamycin Diarrhea    Codeine Nausea Only, Diarrhea and Other     Sulfamethoxazole-Trimethoprim Rash   [2]   Past Medical History:  Diagnosis Date    Acute hemorrhoid 01/30/2024    Diverticular disease 01/30/2024    Diverticulosis of intestine, part unspecified, without perforation or abscess without bleeding     Diverticulosis    Personal history of other diseases of the musculoskeletal system and connective tissue     History of rotator cuff tear    Personal history of other medical treatment     History of screening mammography    Temporal headache 01/30/2024   [3]   Current Outpatient Medications   Medication Sig Dispense Refill    Allergy Relief, loratadine, 10 mg tablet TAKE 1 TABLET BY MOUTH EVERY DAY 30 tablet 11    docusate sodium (Colace) 100 mg capsule Take 1 capsule (100 mg) by mouth 2 times a day. 60 capsule 5    famotidine (Pepcid) 20 mg tablet Take 1 tablet (20 mg) by mouth 2 times a day. 60 tablet 5    fluticasone (Flonase) 50 mcg/actuation nasal spray Inhale 1 spray in each nostril 2 times daily 16 g 5    ibandronate (Boniva) 150 mg tablet Take 1 tablet (150 mg) by mouth every 30 (thirty) days. 1 tablet 11    nystatin (Mycostatin) ointment APPLY TO THE AFFECTED AREA(S) 2-3 times DAILY AS NEEDED (for yeast dermatitis) 15 g 5    pravastatin (Pravachol) 20 mg tablet Take 1 tablet (20 mg) by mouth once daily. as directed 30 tablet 5    hydrocortisone (Anusol-HC) 2.5 % rectal cream Insert into the rectum 4 times a day as needed for hemorrhoids (rectal discomfort) for up to 7 days. Apply to affected areas 30 g 0     No current facility-administered medications for this visit.   [4]   Past Surgical History:  Procedure Laterality Date    NASAL SEPTUM SURGERY  2024    OTHER SURGICAL HISTORY  03/06/2020    Carpal tunnel surgery    OTHER SURGICAL HISTORY  03/06/2020    Trigger finger repair    OTHER SURGICAL HISTORY  03/06/2020    Colonoscopy    OTHER SURGICAL HISTORY  03/06/2020    Arthroplasty    OTHER SURGICAL HISTORY  03/06/2020    Nasal septoplasty    OTHER SURGICAL  HISTORY  03/06/2020    Foot surgery    OTHER SURGICAL HISTORY  03/06/2020    Rotator cuff repair   [5]   Family History  Problem Relation Name Age of Onset    No Known Problems Mother      No Known Problems Father

## 2025-06-20 ENCOUNTER — OFFICE VISIT (OUTPATIENT)
Age: 75
End: 2025-06-20
Payer: COMMERCIAL

## 2025-06-20 VITALS
HEART RATE: 88 BPM | BODY MASS INDEX: 20.17 KG/M2 | HEIGHT: 62 IN | SYSTOLIC BLOOD PRESSURE: 144 MMHG | OXYGEN SATURATION: 98 % | WEIGHT: 109.6 LBS | DIASTOLIC BLOOD PRESSURE: 84 MMHG

## 2025-06-20 DIAGNOSIS — R68.84 PAIN IN LOWER JAW: ICD-10-CM

## 2025-06-20 DIAGNOSIS — R25.2 LEG CRAMPS: ICD-10-CM

## 2025-06-20 DIAGNOSIS — R53.83 OTHER FATIGUE: Primary | ICD-10-CM

## 2025-06-20 DIAGNOSIS — R03.0 ELEVATED BLOOD PRESSURE READING: ICD-10-CM

## 2025-06-20 DIAGNOSIS — K64.9 ACUTE HEMORRHOID: ICD-10-CM

## 2025-06-20 DIAGNOSIS — B37.2 YEAST DERMATITIS: ICD-10-CM

## 2025-06-20 PROCEDURE — 3008F BODY MASS INDEX DOCD: CPT | Performed by: INTERNAL MEDICINE

## 2025-06-20 PROCEDURE — 99214 OFFICE O/P EST MOD 30 MIN: CPT | Performed by: INTERNAL MEDICINE

## 2025-06-20 PROCEDURE — 1159F MED LIST DOCD IN RCRD: CPT | Performed by: INTERNAL MEDICINE

## 2025-06-20 PROCEDURE — 1160F RVW MEDS BY RX/DR IN RCRD: CPT | Performed by: INTERNAL MEDICINE

## 2025-06-20 RX ORDER — HYDROCORTISONE 25 MG/G
CREAM TOPICAL 4 TIMES DAILY PRN
Qty: 30 G | Refills: 0 | Status: SHIPPED | OUTPATIENT
Start: 2025-06-20 | End: 2025-06-27

## 2025-06-20 RX ORDER — NAPROXEN 500 MG/1
500 TABLET ORAL
Qty: 20 TABLET | Refills: 0 | Status: SHIPPED | OUTPATIENT
Start: 2025-06-20 | End: 2025-06-30

## 2025-06-20 RX ORDER — NYSTATIN 100000 U/G
OINTMENT TOPICAL 2 TIMES DAILY
Qty: 15 G | Refills: 5 | Status: SHIPPED | OUTPATIENT
Start: 2025-06-20

## 2025-06-20 ASSESSMENT — ENCOUNTER SYMPTOMS
SHORTNESS OF BREATH: 0
WHEEZING: 0
COUGH: 0
BLOOD IN STOOL: 0
FATIGUE: 1
PALPITATIONS: 0
FEVER: 0
NAUSEA: 0
ABDOMINAL PAIN: 0
BACK PAIN: 0
ARTHRALGIAS: 0
VOMITING: 0
DIARRHEA: 0

## 2025-06-20 NOTE — ASSESSMENT & PLAN NOTE
- Because of her complaints of fatigue I will have her get lab work and we will see her back in follow-up

## 2025-06-20 NOTE — ASSESSMENT & PLAN NOTE
- We will check electrolytes including magnesium and I am encouraging her to drink more fluids throughout the day

## 2025-06-20 NOTE — PROGRESS NOTES
"Subjective   Patient ID: Jaida Malcolm is a 74 y.o. female who presents for Jaw Pain (JAW PAIN X3 WEEKS ).  HPI  She is here today with several concerns.  She explains that she has been having some pain in her left jaw on and off for several months.  She explains that she went to urgent care and they thought perhaps she had a sinus infection.  She took Augmentin as directed and her symptoms got better but did not resolve.  She states she has also been to the dentist and was advised that she perhaps had a tooth infection.  She states in March she had several x-rays of the left side and all the sole was a \"fracture of the last molar on the top \".  She also has been seen in the past by Dr. Morocho and back in August 2024 she had a CAT scan of her sinuses.  At that time she did have sinus disease and was treated accordingly.  She also complains of having occasional leg cramps.  Also her blood pressure is up today.  We did conduct a review of systems.  On today's examination she has slight crepitus at the TMJ region.  Her EACs are patent and TMs unremarkable.  I have decided to do some lab work because of her complaints of fatigue and leg cramps.  I am also going to treat her with an anti-inflammatory for 10 days and have asked that she purchase a bite block to wear at night when she sleeps.  I told her that I am uncertain but we could try these things as it may help.  If she does not get better I will refer her back to ENT.  We will see her back in follow-up  Review of Systems   Constitutional:  Positive for fatigue. Negative for fever.   HENT:  Positive for congestion.    Respiratory:  Negative for cough, shortness of breath and wheezing.    Cardiovascular:  Negative for chest pain, palpitations and leg swelling.   Gastrointestinal:  Negative for abdominal pain, blood in stool, diarrhea, nausea and vomiting.   Musculoskeletal:  Negative for arthralgias and back pain.     Objective   Physical Exam  Vitals and nursing " note reviewed.   Constitutional:       General: She is not in acute distress.     Appearance: Normal appearance.   HENT:      Head: Normocephalic and atraumatic.   Eyes:      Conjunctiva/sclera: Conjunctivae normal.   Cardiovascular:      Rate and Rhythm: Normal rate and regular rhythm.      Heart sounds: Normal heart sounds.   Pulmonary:      Effort: No respiratory distress.      Breath sounds: No wheezing.   Abdominal:      Palpations: Abdomen is soft.      Tenderness: There is no abdominal tenderness. There is no guarding.   Musculoskeletal:         General: No swelling. Normal range of motion.   Skin:     General: Skin is warm and dry.   Neurological:      General: No focal deficit present.      Mental Status: She is alert and oriented to person, place, and time.   Psychiatric:         Behavior: Behavior normal.     CT sinus wo IV contrast  Order: [id number]  Impression      1.  Postsurgical changes post left maxillary antrostomy, partial left ethmoidectomy, and additional postsurgical findings along the inferior left frontal sinus with widening of the left frontal sinus outflow tract.    2.  Mild inflammatory mucosal disease involving the left frontal sinus and ethmoid air cells.  Patent paranasal sinus outflow tracts and the left maxillary antrostomy.    3.  Rightward deviation of the nasal septum.    4.  Apparent narrowing of the anterior left lamina papyracea, possibly postsurgical change.  Correlate with surgical history.          Workstation ID:   [id number]  Narrative    EXAMINATION:  CT SINUS STEALTH WITHOUT CONTRAST    HISTORY:  Deviated nasal septum, sinus pain.  Chronic maxillary sinusitis.  Left nares pain.    COMPARISON:  None    TECHNIQUE:  CT sinuses without contrast.    Dose reduction techniques were achieved by using automated exposure control and/or adjustment of mA and/or kV according to patient size and/or use of iterative reconstruction technique.    FINDINGS:  There are postsurgical changes  along the inferior left frontal sinus with widening of the left frontal sinus outflow tract.  There is mucosal thickening along the left frontal sinus and the left frontal sinus outflow tract is patent.  The right frontal sinus is hypoplastic.  The hypoplastic right frontal sinus and right frontal sinus outflow tract are clear.    There are postsurgical changes post partial left ethmoidectomy.  There is mucosal thickening along the residual left ethmoid air cells, and to a lesser degree along the anterior right ethmoid air cells.  The posterior right ethmoid air cells are well aerated.  There is apparent narrowing along the anterior aspect the left lamina papyracea, possibly reflecting postsurgical change.  The right lamina papyracea is intact.    The sphenoid sinuses, sphenoid sinuses ostia and sphenoethmoidal recesses are clear.  The sphenoid septum inserts left of midline.    Postsurgical changes post left maxillary antrostomy.  The maxillary sinuses are clear.  The right ostiomeatal unit and left maxillary antrostomy are patent.    There is rightward deviation of the nasal septum with a small associated right nasal septal spur.The nasal cavity is clear.    No acute findings in the visualized intracranial structures.  Post bilateral lens replacements.  The orbits and globes appear otherwise unremarkable.  The mastoid air cells are well aerated.  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  There degenerative changes of the left temporomandibular joint.  Assessment/Plan   Problem List Items Addressed This Visit           ICD-10-CM    Other fatigue - Primary R53.83    - Because of her complaints of fatigue I will have her get lab work and we will see her back in follow-up         Relevant Orders    CBC and Auto Differential    TSH    Comprehensive Metabolic Panel    Pain in lower jaw R68.84    - She has been seen by the dentist and had x-rays just a couple of months ago.  The only thing they found abnormal she  states was a possible fracture of her last molar in the upper jawline  -She is also been treated recently at urgent care for sinus infection with Augmentin and she states she has had a partial improvement  -We will try Naprosyn for 10 days and I have asked that she wear a bite block as well.  -We will do some lab work including a sedimentation rate and we will see her back in follow-up         Relevant Medications    naproxen (Naprosyn) 500 mg tablet    Other Relevant Orders    Sedimentation Rate    Leg cramps R25.2    - We will check electrolytes including magnesium and I am encouraging her to drink more fluids throughout the day         Elevated blood pressure reading R03.0    - Her blood pressure was unusually elevated today as it is usually fine.  -She does not have a home blood pressure monitor and her financial resources are scarce.  I will ask her to go to the pharmacy a couple times when she has the opportunity to sit and relax and do the blood pressure check.  -When she comes back we will have her sit forward 10 to 20 minutes and check her blood pressure again          Other Visit Diagnoses         Codes      Yeast dermatitis     B37.2    Relevant Medications    nystatin (Mycostatin) ointment      Acute hemorrhoid     K64.9    Relevant Medications    hydrocortisone (Anusol-HC) 2.5 % rectal cream        Patient instructions  I have ordered lab work to assess your symptoms of fatigue and please go at your earliest convenience to get those done.  If anything is severely abnormal we will call you right away and otherwise we will go over the results when you come back  I am recommending that you get a bite guard for TMJ.  These are sold at most pharmacies and we want you to wear this at night while you sleep.  Sometimes when we sleep we grind our teeth and this can cause pain in the jaw and the temporomandibular joint region.  By wearing this at night you do not grind your teeth and oftentimes symptoms get  better.  I am also giving you a 10-day prescription of Naprosyn and take this twice a day with food to completion  Please also remember to drink more fluids because one of the biggest causes of leg cramps is dehydration.  Please also try to check your blood pressure at the pharmacies that offer free blood pressure checks.  Ideally we would like for you to get a blood pressure monitor so you can check at home.  If you decide to purchase a monitor we recommend the brand made by [employer] and we recommend you get the type that goes around your arm.  I will see you back in a couple of weeks       Lynette Meeks, DO

## 2025-06-20 NOTE — ASSESSMENT & PLAN NOTE
- Her blood pressure was unusually elevated today as it is usually fine.  -She does not have a home blood pressure monitor and her financial resources are scarce.  I will ask her to go to the pharmacy a couple times when she has the opportunity to sit and relax and do the blood pressure check.  -When she comes back we will have her sit forward 10 to 20 minutes and check her blood pressure again

## 2025-06-20 NOTE — ASSESSMENT & PLAN NOTE
- She has been seen by the dentist and had x-rays just a couple of months ago.  The only thing they found abnormal she states was a possible fracture of her last molar in the upper jawline  -She is also been treated recently at urgent care for sinus infection with Augmentin and she states she has had a partial improvement  -We will try Naprosyn for 10 days and I have asked that she wear a bite block as well.  -We will do some lab work including a sedimentation rate and we will see her back in follow-up

## 2025-06-20 NOTE — PATIENT INSTRUCTIONS
Patient instructions  I have ordered lab work to assess your symptoms of fatigue and please go at your earliest convenience to get those done.  If anything is severely abnormal we will call you right away and otherwise we will go over the results when you come back  I am recommending that you get a bite guard for TMJ.  These are sold at most pharmacies and we want you to wear this at night while you sleep.  Sometimes when we sleep we grind our teeth and this can cause pain in the jaw and the temporomandibular joint region.  By wearing this at night you do not grind your teeth and oftentimes symptoms get better.  I am also giving you a 10-day prescription of Naprosyn and take this twice a day with food to completion  Please also remember to drink more fluids because one of the biggest causes of leg cramps is dehydration.  Please also try to check your blood pressure at the pharmacies that offer free blood pressure checks.  Ideally we would like for you to get a blood pressure monitor so you can check at home.  If you decide to purchase a monitor we recommend the brand made by [employer] and we recommend you get the type that goes around your arm.  I will see you back in a couple of weeks

## 2025-06-21 LAB
ALBUMIN SERPL-MCNC: 4.6 G/DL (ref 3.6–5.1)
ALP SERPL-CCNC: 51 U/L (ref 37–153)
ALT SERPL-CCNC: 22 U/L (ref 6–29)
ANION GAP SERPL CALCULATED.4IONS-SCNC: 9 MMOL/L (CALC) (ref 7–17)
AST SERPL-CCNC: 21 U/L (ref 10–35)
BASOPHILS # BLD AUTO: 40 CELLS/UL (ref 0–200)
BASOPHILS NFR BLD AUTO: 0.8 %
BILIRUB SERPL-MCNC: 0.7 MG/DL (ref 0.2–1.2)
BUN SERPL-MCNC: 20 MG/DL (ref 7–25)
CALCIUM SERPL-MCNC: 9.6 MG/DL (ref 8.6–10.4)
CHLORIDE SERPL-SCNC: 105 MMOL/L (ref 98–110)
CO2 SERPL-SCNC: 27 MMOL/L (ref 20–32)
CREAT SERPL-MCNC: 0.77 MG/DL (ref 0.6–1)
EGFRCR SERPLBLD CKD-EPI 2021: 81 ML/MIN/1.73M2
EOSINOPHIL # BLD AUTO: 60 CELLS/UL (ref 15–500)
EOSINOPHIL NFR BLD AUTO: 1.2 %
ERYTHROCYTE [DISTWIDTH] IN BLOOD BY AUTOMATED COUNT: 13.3 % (ref 11–15)
ERYTHROCYTE [SEDIMENTATION RATE] IN BLOOD BY WESTERGREN METHOD: 6 MM/H
GLUCOSE SERPL-MCNC: 76 MG/DL (ref 65–139)
HCT VFR BLD AUTO: 40.3 % (ref 35–45)
HGB BLD-MCNC: 13.1 G/DL (ref 11.7–15.5)
LYMPHOCYTES # BLD AUTO: 1115 CELLS/UL (ref 850–3900)
LYMPHOCYTES NFR BLD AUTO: 22.3 %
MCH RBC QN AUTO: 32.8 PG (ref 27–33)
MCHC RBC AUTO-ENTMCNC: 32.5 G/DL (ref 32–36)
MCV RBC AUTO: 101 FL (ref 80–100)
MONOCYTES # BLD AUTO: 425 CELLS/UL (ref 200–950)
MONOCYTES NFR BLD AUTO: 8.5 %
NEUTROPHILS # BLD AUTO: 3360 CELLS/UL (ref 1500–7800)
NEUTROPHILS NFR BLD AUTO: 67.2 %
PLATELET # BLD AUTO: 243 THOUSAND/UL (ref 140–400)
PMV BLD REES-ECKER: 11.1 FL (ref 7.5–12.5)
POTASSIUM SERPL-SCNC: 4 MMOL/L (ref 3.5–5.3)
PROT SERPL-MCNC: 6.8 G/DL (ref 6.1–8.1)
RBC # BLD AUTO: 3.99 MILLION/UL (ref 3.8–5.1)
SODIUM SERPL-SCNC: 141 MMOL/L (ref 135–146)
TSH SERPL-ACNC: 2.99 MIU/L (ref 0.4–4.5)
WBC # BLD AUTO: 5 THOUSAND/UL (ref 3.8–10.8)

## 2025-06-22 DIAGNOSIS — R09.82 PND (POST-NASAL DRIP): ICD-10-CM

## 2025-06-23 ENCOUNTER — TELEPHONE (OUTPATIENT)
Age: 75
End: 2025-06-23
Payer: COMMERCIAL

## 2025-06-23 RX ORDER — FLUTICASONE PROPIONATE 50 MCG
SPRAY, SUSPENSION (ML) NASAL
Qty: 16 G | Refills: 5 | Status: SHIPPED | OUTPATIENT
Start: 2025-06-23

## 2025-06-23 NOTE — TELEPHONE ENCOUNTER
SHE STOPPED TAKING NAPROXEN BECAUSE IT IS CAUSING  NAUSEA  SHE HAD TAKEN 5 PILLS AND CAN NO LONGER TAKE ANY  MORE

## 2025-07-07 ENCOUNTER — APPOINTMENT (OUTPATIENT)
Age: 75
End: 2025-07-07
Payer: COMMERCIAL

## 2025-07-07 VITALS
OXYGEN SATURATION: 99 % | BODY MASS INDEX: 20.04 KG/M2 | WEIGHT: 108.9 LBS | DIASTOLIC BLOOD PRESSURE: 72 MMHG | HEIGHT: 62 IN | SYSTOLIC BLOOD PRESSURE: 136 MMHG | HEART RATE: 79 BPM

## 2025-07-07 DIAGNOSIS — J34.89 PAIN OF MAXILLARY SINUS: Primary | ICD-10-CM

## 2025-07-07 DIAGNOSIS — J01.01 RECURRENT MAXILLARY SINUSITIS: ICD-10-CM

## 2025-07-07 PROCEDURE — 1159F MED LIST DOCD IN RCRD: CPT | Performed by: INTERNAL MEDICINE

## 2025-07-07 PROCEDURE — 99213 OFFICE O/P EST LOW 20 MIN: CPT | Performed by: INTERNAL MEDICINE

## 2025-07-07 PROCEDURE — 1160F RVW MEDS BY RX/DR IN RCRD: CPT | Performed by: INTERNAL MEDICINE

## 2025-07-07 PROCEDURE — 3008F BODY MASS INDEX DOCD: CPT | Performed by: INTERNAL MEDICINE

## 2025-07-07 PROCEDURE — 1036F TOBACCO NON-USER: CPT | Performed by: INTERNAL MEDICINE

## 2025-07-07 NOTE — PROGRESS NOTES
Subjective   Patient ID: Jaida Malcolm is a 74 y.o. female who presents for Follow-up (2 WK CK).  HPI  She is here today for follow-up regarding her left-sided jaw pain.  We gave her Naprosyn but unfortunately just after a few days it upset her stomach.  Laboratory test results are all completely normal including sedimentation rate.  We discussed her history of recurrent sinusitis.  She had a CT scan back in August 2024 ordered by Dr. Morocho.  It showed significant sinus disease.  She states that her dentist believes that she may have a crack in the upper molar on the Left side.  She states however that the exact diagnosis is still in question as to whether her tooth is causing her symptoms.  I have decided to order a CT scan of her sinuses and have agreed to contact her with results.  I looked in her ear today and everything looks fine.  She will see us back next month as previously planned  Objective   Physical Exam  Vitals and nursing note reviewed.   Constitutional:       General: She is not in acute distress.     Appearance: Normal appearance.   HENT:      Head: Normocephalic and atraumatic.   Eyes:      Conjunctiva/sclera: Conjunctivae normal.   Cardiovascular:      Rate and Rhythm: Normal rate and regular rhythm.      Heart sounds: Normal heart sounds.   Pulmonary:      Effort: No respiratory distress.      Breath sounds: No wheezing.   Abdominal:      Palpations: Abdomen is soft.      Tenderness: There is no abdominal tenderness. There is no guarding.   Musculoskeletal:         General: No swelling. Normal range of motion.   Skin:     General: Skin is warm and dry.   Neurological:      General: No focal deficit present.      Mental Status: She is alert and oriented to person, place, and time.   Psychiatric:         Behavior: Behavior normal.       Recent Results (from the past 4 weeks)   CBC and Auto Differential    Collection Time: 06/20/25 10:27 AM   Result Value Ref Range    WHITE BLOOD CELL COUNT 5.0  3.8 - 10.8 Thousand/uL    RED BLOOD CELL COUNT 3.99 3.80 - 5.10 Million/uL    HEMOGLOBIN 13.1 11.7 - 15.5 g/dL    HEMATOCRIT 40.3 35.0 - 45.0 %    .0 (H) 80.0 - 100.0 fL    MCH 32.8 27.0 - 33.0 pg    MCHC 32.5 32.0 - 36.0 g/dL    RDW 13.3 11.0 - 15.0 %    PLATELET COUNT 243 140 - 400 Thousand/uL    MPV 11.1 7.5 - 12.5 fL    ABSOLUTE NEUTROPHILS 3,360 1,500 - 7,800 cells/uL    ABSOLUTE LYMPHOCYTES 1,115 850 - 3,900 cells/uL    ABSOLUTE MONOCYTES 425 200 - 950 cells/uL    ABSOLUTE EOSINOPHILS 60 15 - 500 cells/uL    ABSOLUTE BASOPHILS 40 0 - 200 cells/uL    NEUTROPHILS 67.2 %    LYMPHOCYTES 22.3 %    MONOCYTES 8.5 %    EOSINOPHILS 1.2 %    BASOPHILS 0.8 %   TSH    Collection Time: 06/20/25 10:27 AM   Result Value Ref Range    TSH 2.99 0.40 - 4.50 mIU/L   Sedimentation Rate    Collection Time: 06/20/25 10:27 AM   Result Value Ref Range    SED RATE BY MODIFIED WESTERGREN 6 < OR = 30 mm/h   Comprehensive Metabolic Panel    Collection Time: 06/20/25 10:27 AM   Result Value Ref Range    GLUCOSE 76 65 - 139 mg/dL    UREA NITROGEN (BUN) 20 7 - 25 mg/dL    CREATININE 0.77 0.60 - 1.00 mg/dL    EGFR 81 > OR = 60 mL/min/1.73m2    SODIUM 141 135 - 146 mmol/L    POTASSIUM 4.0 3.5 - 5.3 mmol/L    CHLORIDE 105 98 - 110 mmol/L    CARBON DIOXIDE 27 20 - 32 mmol/L    ELECTROLYTE BALANCE 9 7 - 17 mmol/L (calc)    CALCIUM 9.6 8.6 - 10.4 mg/dL    PROTEIN, TOTAL 6.8 6.1 - 8.1 g/dL    ALBUMIN 4.6 3.6 - 5.1 g/dL    BILIRUBIN, TOTAL 0.7 0.2 - 1.2 mg/dL    ALKALINE PHOSPHATASE 51 37 - 153 U/L    AST 21 10 - 35 U/L    ALT 22 6 - 29 U/L       Assessment/Plan   Problem List Items Addressed This Visit           ICD-10-CM    Pain of maxillary sinus - Primary J34.89    - I am ordering a CT scan of her sinuses given the fact that she has had significant sinus disease in the past.  Last CT scan was ordered by Dr. Morocho on 8 of 2024 and it showed significant disease.  -Laboratory test results are normal  -She will continue to follow closely  with her dentist and she is holding her Boniva because she was instructed that it would need to be held for 4 months before he could do a procedure.  She has already had 1 month of cessation  -She will follow-up with her dentist and I will call her with the results of the CT scan         Relevant Orders    CT sinus wo IV contrast    Recurrent maxillary sinusitis J01.01    Relevant Orders    CT sinus wo IV contrast   Patient instructions  As we discussed I have ordered a CT scan of your sinuses and staff will be calling you about getting that scheduled.  Once the results are known I will contact you  Please stay off the Boniva as instructed by your dentist and follow closely with him.  Please call with any questions or concerns and otherwise we will see you back as previously planned       Lynette Meeks, DO

## 2025-07-07 NOTE — ASSESSMENT & PLAN NOTE
- I am ordering a CT scan of her sinuses given the fact that she has had significant sinus disease in the past.  Last CT scan was ordered by Dr. Morocho on 8 of 2024 and it showed significant disease.  -Laboratory test results are normal  -She will continue to follow closely with her dentist and she is holding her Boniva because she was instructed that it would need to be held for 4 months before he could do a procedure.  She has already had 1 month of cessation  -She will follow-up with her dentist and I will call her with the results of the CT scan

## 2025-07-07 NOTE — PATIENT INSTRUCTIONS
Patient instructions  As we discussed I have ordered a CT scan of your sinuses and staff will be calling you about getting that scheduled.  Once the results are known I will contact you  Please stay off the Boniva as instructed by your dentist and follow closely with him.  Please call with any questions or concerns and otherwise we will see you back as previously planned

## 2025-07-23 ENCOUNTER — HOSPITAL ENCOUNTER (OUTPATIENT)
Dept: RADIOLOGY | Facility: HOSPITAL | Age: 75
Discharge: HOME | End: 2025-07-23
Payer: COMMERCIAL

## 2025-07-23 DIAGNOSIS — J01.01 RECURRENT MAXILLARY SINUSITIS: ICD-10-CM

## 2025-07-23 DIAGNOSIS — J34.89 PAIN OF MAXILLARY SINUS: ICD-10-CM

## 2025-07-23 PROCEDURE — 70486 CT MAXILLOFACIAL W/O DYE: CPT | Performed by: RADIOLOGY

## 2025-07-23 PROCEDURE — 70486 CT MAXILLOFACIAL W/O DYE: CPT

## 2025-07-25 DIAGNOSIS — R09.82 PND (POST-NASAL DRIP): ICD-10-CM

## 2025-07-25 DIAGNOSIS — B37.2 YEAST DERMATITIS: ICD-10-CM

## 2025-07-25 DIAGNOSIS — K59.09 CHRONIC CONSTIPATION: ICD-10-CM

## 2025-07-25 RX ORDER — LORATADINE 10 MG/1
10 TABLET ORAL DAILY
Qty: 30 TABLET | Refills: 1 | Status: SHIPPED | OUTPATIENT
Start: 2025-07-25

## 2025-07-25 RX ORDER — DOCUSATE SODIUM 100 MG/1
100 CAPSULE, LIQUID FILLED ORAL 2 TIMES DAILY
Qty: 60 CAPSULE | Refills: 1 | Status: SHIPPED | OUTPATIENT
Start: 2025-07-25

## 2025-07-25 RX ORDER — NYSTATIN 100000 U/G
OINTMENT TOPICAL 2 TIMES DAILY
Qty: 15 G | Refills: 1 | Status: SHIPPED | OUTPATIENT
Start: 2025-07-25

## 2025-08-07 ENCOUNTER — HOSPITAL ENCOUNTER (OUTPATIENT)
Dept: RADIOLOGY | Facility: CLINIC | Age: 75
Discharge: HOME | End: 2025-08-07
Payer: COMMERCIAL

## 2025-08-07 VITALS — BODY MASS INDEX: 20.03 KG/M2 | HEIGHT: 62 IN | WEIGHT: 108.84 LBS

## 2025-08-07 DIAGNOSIS — Z78.0 MENOPAUSE: ICD-10-CM

## 2025-08-07 DIAGNOSIS — Z12.31 ENCOUNTER FOR SCREENING MAMMOGRAM FOR MALIGNANT NEOPLASM OF BREAST: ICD-10-CM

## 2025-08-07 PROCEDURE — 77063 BREAST TOMOSYNTHESIS BI: CPT | Performed by: RADIOLOGY

## 2025-08-07 PROCEDURE — 77080 DXA BONE DENSITY AXIAL: CPT

## 2025-08-07 PROCEDURE — 77067 SCR MAMMO BI INCL CAD: CPT

## 2025-08-07 PROCEDURE — 77067 SCR MAMMO BI INCL CAD: CPT | Performed by: RADIOLOGY

## 2025-08-07 PROCEDURE — 77080 DXA BONE DENSITY AXIAL: CPT | Performed by: STUDENT IN AN ORGANIZED HEALTH CARE EDUCATION/TRAINING PROGRAM

## 2025-08-21 ENCOUNTER — APPOINTMENT (OUTPATIENT)
Age: 75
End: 2025-08-21
Payer: COMMERCIAL

## 2025-08-27 ENCOUNTER — APPOINTMENT (OUTPATIENT)
Age: 75
End: 2025-08-27
Payer: COMMERCIAL

## 2025-08-27 VITALS
HEART RATE: 76 BPM | WEIGHT: 108.8 LBS | HEIGHT: 62 IN | DIASTOLIC BLOOD PRESSURE: 74 MMHG | OXYGEN SATURATION: 96 % | SYSTOLIC BLOOD PRESSURE: 124 MMHG | BODY MASS INDEX: 20.02 KG/M2

## 2025-08-27 DIAGNOSIS — K59.09 CHRONIC CONSTIPATION: ICD-10-CM

## 2025-08-27 DIAGNOSIS — E78.2 MIXED HYPERLIPIDEMIA: Primary | ICD-10-CM

## 2025-08-27 DIAGNOSIS — B37.2 YEAST DERMATITIS: ICD-10-CM

## 2025-08-27 DIAGNOSIS — K21.9 CHRONIC GERD: ICD-10-CM

## 2025-08-27 DIAGNOSIS — J01.01 RECURRENT MAXILLARY SINUSITIS: ICD-10-CM

## 2025-08-27 PROBLEM — J34.89 PAIN OF MAXILLARY SINUS: Status: RESOLVED | Noted: 2025-07-07 | Resolved: 2025-08-27

## 2025-08-27 PROBLEM — Z12.31 ENCOUNTER FOR SCREENING MAMMOGRAM FOR MALIGNANT NEOPLASM OF BREAST: Status: RESOLVED | Noted: 2024-06-04 | Resolved: 2025-08-27

## 2025-08-27 PROBLEM — R03.0 ELEVATED BLOOD PRESSURE READING: Status: RESOLVED | Noted: 2025-06-20 | Resolved: 2025-08-27

## 2025-08-27 PROCEDURE — 1159F MED LIST DOCD IN RCRD: CPT | Performed by: INTERNAL MEDICINE

## 2025-08-27 PROCEDURE — 3008F BODY MASS INDEX DOCD: CPT | Performed by: INTERNAL MEDICINE

## 2025-08-27 PROCEDURE — 99214 OFFICE O/P EST MOD 30 MIN: CPT | Performed by: INTERNAL MEDICINE

## 2025-08-27 PROCEDURE — 1036F TOBACCO NON-USER: CPT | Performed by: INTERNAL MEDICINE

## 2025-08-27 PROCEDURE — 1160F RVW MEDS BY RX/DR IN RCRD: CPT | Performed by: INTERNAL MEDICINE

## 2025-08-27 RX ORDER — AZELASTINE 1 MG/ML
1 SPRAY, METERED NASAL 2 TIMES DAILY
COMMUNITY

## 2025-08-27 RX ORDER — PRAVASTATIN SODIUM 20 MG/1
20 TABLET ORAL DAILY
Qty: 30 TABLET | Refills: 5 | Status: SHIPPED | OUTPATIENT
Start: 2025-08-27

## 2025-08-27 RX ORDER — FAMOTIDINE 20 MG/1
20 TABLET, FILM COATED ORAL 2 TIMES DAILY
Qty: 60 TABLET | Refills: 5 | Status: SHIPPED | OUTPATIENT
Start: 2025-08-27

## 2025-08-27 RX ORDER — DOCUSATE SODIUM 100 MG/1
100 CAPSULE, LIQUID FILLED ORAL 2 TIMES DAILY
Qty: 60 CAPSULE | Refills: 1 | Status: SHIPPED | OUTPATIENT
Start: 2025-08-27 | End: 2025-08-27

## 2025-08-27 RX ORDER — DOCUSATE SODIUM 100 MG/1
100 CAPSULE, LIQUID FILLED ORAL 2 TIMES DAILY
Qty: 60 CAPSULE | Refills: 5 | Status: SHIPPED | OUTPATIENT
Start: 2025-08-27

## 2025-08-27 RX ORDER — NYSTATIN 100000 U/G
OINTMENT TOPICAL 2 TIMES DAILY
Qty: 15 G | Refills: 1 | Status: SHIPPED | OUTPATIENT
Start: 2025-08-27

## 2025-08-27 ASSESSMENT — ENCOUNTER SYMPTOMS
VOMITING: 0
ABDOMINAL PAIN: 0
NAUSEA: 0
PALPITATIONS: 0
FATIGUE: 1
BLOOD IN STOOL: 0
COUGH: 0
WHEEZING: 0
DIARRHEA: 0
SHORTNESS OF BREATH: 0

## 2026-02-25 ENCOUNTER — APPOINTMENT (OUTPATIENT)
Age: 76
End: 2026-02-25
Payer: COMMERCIAL